# Patient Record
Sex: MALE | Race: WHITE | NOT HISPANIC OR LATINO | URBAN - METROPOLITAN AREA
[De-identification: names, ages, dates, MRNs, and addresses within clinical notes are randomized per-mention and may not be internally consistent; named-entity substitution may affect disease eponyms.]

---

## 2017-06-26 RX ORDER — VERAPAMIL HCL 240 MG
0 CAPSULE, EXTENDED RELEASE PELLETS 24 HR ORAL
Qty: 90 | Refills: 0 | COMMUNITY
Start: 2017-06-26

## 2017-06-26 RX ORDER — VERAPAMIL HCL 240 MG
1 CAPSULE, EXTENDED RELEASE PELLETS 24 HR ORAL
Qty: 90 | Refills: 0 | DISCHARGE
Start: 2017-06-26

## 2017-06-28 RX ORDER — ATORVASTATIN CALCIUM 80 MG/1
0 TABLET, FILM COATED ORAL
Qty: 90 | Refills: 0 | COMMUNITY
Start: 2017-06-28

## 2017-06-28 RX ORDER — ATORVASTATIN CALCIUM 80 MG/1
1 TABLET, FILM COATED ORAL
Qty: 90 | Refills: 0 | DISCHARGE
Start: 2017-06-28

## 2017-07-08 RX ORDER — PHENELZINE SULFATE 15 MG/1
2 TABLET, FILM COATED ORAL
Qty: 360 | Refills: 0 | COMMUNITY
Start: 2017-07-08

## 2017-07-08 RX ORDER — PHENELZINE SULFATE 15 MG/1
0 TABLET, FILM COATED ORAL
Qty: 360 | Refills: 0 | COMMUNITY
Start: 2017-07-08

## 2017-07-08 RX ORDER — PHENELZINE SULFATE 15 MG/1
1 TABLET, FILM COATED ORAL
Qty: 360 | Refills: 0 | COMMUNITY
Start: 2017-07-08

## 2017-08-03 ENCOUNTER — OUTPATIENT (OUTPATIENT)
Dept: OUTPATIENT SERVICES | Facility: HOSPITAL | Age: 77
LOS: 1 days | End: 2017-08-03
Payer: MEDICARE

## 2017-08-03 VITALS
OXYGEN SATURATION: 97 % | DIASTOLIC BLOOD PRESSURE: 80 MMHG | SYSTOLIC BLOOD PRESSURE: 124 MMHG | RESPIRATION RATE: 14 BRPM | HEIGHT: 65 IN | WEIGHT: 246.92 LBS | HEART RATE: 62 BPM | TEMPERATURE: 98 F

## 2017-08-03 DIAGNOSIS — Z98.890 OTHER SPECIFIED POSTPROCEDURAL STATES: Chronic | ICD-10-CM

## 2017-08-03 DIAGNOSIS — M16.11 UNILATERAL PRIMARY OSTEOARTHRITIS, RIGHT HIP: ICD-10-CM

## 2017-08-03 DIAGNOSIS — Z01.818 ENCOUNTER FOR OTHER PREPROCEDURAL EXAMINATION: ICD-10-CM

## 2017-08-03 DIAGNOSIS — Z90.89 ACQUIRED ABSENCE OF OTHER ORGANS: Chronic | ICD-10-CM

## 2017-08-03 DIAGNOSIS — Z87.39 PERSONAL HISTORY OF OTHER DISEASES OF THE MUSCULOSKELETAL SYSTEM AND CONNECTIVE TISSUE: ICD-10-CM

## 2017-08-03 LAB
ALBUMIN SERPL ELPH-MCNC: 3.9 G/DL — SIGNIFICANT CHANGE UP (ref 3.3–5)
ALP SERPL-CCNC: 64 U/L — SIGNIFICANT CHANGE UP (ref 30–120)
ALT FLD-CCNC: 26 U/L DA — SIGNIFICANT CHANGE UP (ref 10–60)
ANION GAP SERPL CALC-SCNC: 8 MMOL/L — SIGNIFICANT CHANGE UP (ref 5–17)
APTT BLD: 34.9 SEC — SIGNIFICANT CHANGE UP (ref 27.5–37.4)
AST SERPL-CCNC: 21 U/L — SIGNIFICANT CHANGE UP (ref 10–40)
BILIRUB SERPL-MCNC: 1 MG/DL — SIGNIFICANT CHANGE UP (ref 0.2–1.2)
BLD GP AB SCN SERPL QL: SIGNIFICANT CHANGE UP
BUN SERPL-MCNC: 19 MG/DL — SIGNIFICANT CHANGE UP (ref 7–23)
CALCIUM SERPL-MCNC: 9.5 MG/DL — SIGNIFICANT CHANGE UP (ref 8.4–10.5)
CHLORIDE SERPL-SCNC: 103 MMOL/L — SIGNIFICANT CHANGE UP (ref 96–108)
CO2 SERPL-SCNC: 28 MMOL/L — SIGNIFICANT CHANGE UP (ref 22–31)
CREAT SERPL-MCNC: 0.92 MG/DL — SIGNIFICANT CHANGE UP (ref 0.5–1.3)
GLUCOSE SERPL-MCNC: 95 MG/DL — SIGNIFICANT CHANGE UP (ref 70–99)
HCT VFR BLD CALC: 38.9 % — LOW (ref 39–50)
HGB BLD-MCNC: 13 G/DL — SIGNIFICANT CHANGE UP (ref 13–17)
INR BLD: 0.98 RATIO — SIGNIFICANT CHANGE UP (ref 0.88–1.16)
MCHC RBC-ENTMCNC: 30.4 PG — SIGNIFICANT CHANGE UP (ref 27–34)
MCHC RBC-ENTMCNC: 33.3 GM/DL — SIGNIFICANT CHANGE UP (ref 32–36)
MCV RBC AUTO: 91.5 FL — SIGNIFICANT CHANGE UP (ref 80–100)
MRSA PCR RESULT.: SIGNIFICANT CHANGE UP
PLATELET # BLD AUTO: 201 K/UL — SIGNIFICANT CHANGE UP (ref 150–400)
POTASSIUM SERPL-MCNC: 4.9 MMOL/L — SIGNIFICANT CHANGE UP (ref 3.5–5.3)
POTASSIUM SERPL-SCNC: 4.9 MMOL/L — SIGNIFICANT CHANGE UP (ref 3.5–5.3)
PROT SERPL-MCNC: 7.7 G/DL — SIGNIFICANT CHANGE UP (ref 6–8.3)
PROTHROM AB SERPL-ACNC: 10.7 SEC — SIGNIFICANT CHANGE UP (ref 9.8–12.7)
RBC # BLD: 4.26 M/UL — SIGNIFICANT CHANGE UP (ref 4.2–5.8)
RBC # FLD: 11.8 % — SIGNIFICANT CHANGE UP (ref 10.3–14.5)
S AUREUS DNA NOSE QL NAA+PROBE: DETECTED
SODIUM SERPL-SCNC: 139 MMOL/L — SIGNIFICANT CHANGE UP (ref 135–145)
WBC # BLD: 5.1 K/UL — SIGNIFICANT CHANGE UP (ref 3.8–10.5)
WBC # FLD AUTO: 5.1 K/UL — SIGNIFICANT CHANGE UP (ref 3.8–10.5)

## 2017-08-03 PROCEDURE — 86900 BLOOD TYPING SEROLOGIC ABO: CPT

## 2017-08-03 PROCEDURE — 87640 STAPH A DNA AMP PROBE: CPT

## 2017-08-03 PROCEDURE — 85027 COMPLETE CBC AUTOMATED: CPT

## 2017-08-03 PROCEDURE — 85730 THROMBOPLASTIN TIME PARTIAL: CPT

## 2017-08-03 PROCEDURE — 86901 BLOOD TYPING SEROLOGIC RH(D): CPT

## 2017-08-03 PROCEDURE — 80053 COMPREHEN METABOLIC PANEL: CPT

## 2017-08-03 PROCEDURE — G0463: CPT

## 2017-08-03 PROCEDURE — 87641 MR-STAPH DNA AMP PROBE: CPT

## 2017-08-03 PROCEDURE — 86850 RBC ANTIBODY SCREEN: CPT

## 2017-08-03 PROCEDURE — 85610 PROTHROMBIN TIME: CPT

## 2017-08-03 PROCEDURE — 36415 COLL VENOUS BLD VENIPUNCTURE: CPT

## 2017-08-03 NOTE — H&P PST ADULT - HISTORY OF PRESENT ILLNESS
77 Y.o . male presents here w/ long standing hx. of right knee pain 10/10 w/ decreased R.O.M., ambulation, mobility and ADL function. Seen by  and referred for surgery.  Takes no meds

## 2017-08-03 NOTE — H&P PST ADULT - PSH
H/O eye surgery  drooping upper eyelids 2013  S/P inguinal hernia repair    S/P tonsillectomy  as a child

## 2017-08-03 NOTE — H&P PST ADULT - PROBLEM SELECTOR PLAN 1
Right Total Hip Replacement  NPO after Midnight. Take pepcid as ordered.  Nose cx instructions reviewed. Dietary instructions reviewed. 3day wipes reviewed. D/C instructions reviewed.  Patient advised of no jewelry day of surgery.  Handouts given.  Medication reviewed. Stop NSAIDS, ASA herbals, vit E per PMD instructions or 7 days prior to surgery .  Medical Clearance to be obtained.  SHAUNA CHEN  Pharmacy consult done.  Day of Surgery you can take the following meds with a small sip of water. nardil, verapamil, donepezil, lorazepam

## 2017-08-03 NOTE — H&P PST ADULT - NSANTHOSAYNRD_GEN_A_CORE
No. KWAKU screening performed.  STOP BANG Legend: 0-2 = LOW Risk; 3-4 = INTERMEDIATE Risk; 5-8 = HIGH Risk

## 2017-08-03 NOTE — H&P PST ADULT - FAMILY HISTORY
Father  Still living? No  Family history of cardiac disorder, Age at diagnosis: Age Unknown     Mother  Still living? No  Family history of liver cancer, Age at diagnosis: Age Unknown

## 2017-08-04 RX ORDER — MUPIROCIN 20 MG/G
1 OINTMENT TOPICAL
Qty: 1 | Refills: 0 | OUTPATIENT
Start: 2017-08-04 | End: 2017-08-09

## 2017-08-04 NOTE — PROGRESS NOTE ADULT - SUBJECTIVE AND OBJECTIVE BOX
Nose culture PCR results: staph aureus detected  Topical Mupirocin escribed  Spoke to patient's wife, understands treatment

## 2017-08-11 RX ORDER — SODIUM CHLORIDE 9 MG/ML
1000 INJECTION, SOLUTION INTRAVENOUS
Qty: 0 | Refills: 0 | Status: DISCONTINUED | OUTPATIENT
Start: 2017-08-17 | End: 2017-08-17

## 2017-08-11 NOTE — PROGRESS NOTE ADULT - SUBJECTIVE AND OBJECTIVE BOX
Pharmacy presurgical evaluation:  NKDA    Past Medical History:  Anxiety and depression  (Lorazepam 1mg Qday, Phenelzine 15mg Qday – MAO inhibitor)  Mild BPH  Mild cognitive impairment (Donepezil 10mg Qday)  OA  HLD (Atorvastatin 20mg HS, Aspirin 81mg Qday)  HTN (Enalapril 2.5mg Qday, Verapamil ER 240mg Qday)    Past Surgical History:  S/P inguinal hernia repair    S/P tonsillectomy  as a child    RECOMMENDATIONS:  1. Patient takes Phenelzine, an MAO inhibitor. MAO inhibitors will interact with all opioids, including tramadol. Recommendation is to discontinue MAO inhibitor 10-14 days before initiation of therapy with oxycodone. MAOIs have been reported to intensify the effects of opioid drugs, resulting in increased risk for anxiety, confusion, hypotension, respiratory depression, profound sedation, coma, and death. According to PMD clearance, patient was instructed to discontinue Phenelzine 10 days prior to surgery. Instructed to take Lorazepam 1mg QID PRN. Patient is aware that if anxiety becomes progressively worse or unmanageable postop he may need a psychiatry consult. He has been instructed to remain off Phenelzine until all narcotics are finished.  2. Donepezil and ondansetron concurrent use can lead to QT prolongation and Torsades de pointes – remain on telemetry until PONV is no longer an issue.  3. Donepezil and oxycodone concurrently can lower seizure threshold – monitor patient closely.

## 2017-08-16 RX ORDER — ONDANSETRON 8 MG/1
4 TABLET, FILM COATED ORAL EVERY 6 HOURS
Qty: 0 | Refills: 0 | Status: DISCONTINUED | OUTPATIENT
Start: 2017-08-17 | End: 2017-08-21

## 2017-08-16 RX ORDER — SENNA PLUS 8.6 MG/1
2 TABLET ORAL AT BEDTIME
Qty: 0 | Refills: 0 | Status: DISCONTINUED | OUTPATIENT
Start: 2017-08-17 | End: 2017-08-21

## 2017-08-16 RX ORDER — POLYETHYLENE GLYCOL 3350 17 G/17G
17 POWDER, FOR SOLUTION ORAL DAILY
Qty: 0 | Refills: 0 | Status: DISCONTINUED | OUTPATIENT
Start: 2017-08-17 | End: 2017-08-21

## 2017-08-16 RX ORDER — CELECOXIB 200 MG/1
200 CAPSULE ORAL ONCE
Qty: 0 | Refills: 0 | Status: COMPLETED | OUTPATIENT
Start: 2017-08-17 | End: 2017-08-17

## 2017-08-16 RX ORDER — MAGNESIUM HYDROXIDE 400 MG/1
30 TABLET, CHEWABLE ORAL DAILY
Qty: 0 | Refills: 0 | Status: DISCONTINUED | OUTPATIENT
Start: 2017-08-17 | End: 2017-08-21

## 2017-08-16 RX ORDER — SODIUM CHLORIDE 9 MG/ML
1000 INJECTION, SOLUTION INTRAVENOUS
Qty: 0 | Refills: 0 | Status: DISCONTINUED | OUTPATIENT
Start: 2017-08-17 | End: 2017-08-21

## 2017-08-16 RX ORDER — APREPITANT 80 MG/1
40 CAPSULE ORAL ONCE
Qty: 0 | Refills: 0 | Status: COMPLETED | OUTPATIENT
Start: 2017-08-17 | End: 2017-08-17

## 2017-08-16 RX ORDER — PANTOPRAZOLE SODIUM 20 MG/1
40 TABLET, DELAYED RELEASE ORAL
Qty: 0 | Refills: 0 | Status: DISCONTINUED | OUTPATIENT
Start: 2017-08-17 | End: 2017-08-21

## 2017-08-16 RX ORDER — DOCUSATE SODIUM 100 MG
100 CAPSULE ORAL THREE TIMES A DAY
Qty: 0 | Refills: 0 | Status: DISCONTINUED | OUTPATIENT
Start: 2017-08-17 | End: 2017-08-21

## 2017-08-17 ENCOUNTER — INPATIENT (INPATIENT)
Facility: HOSPITAL | Age: 77
LOS: 3 days | Discharge: HOME | DRG: 469 | End: 2017-08-21
Attending: ORTHOPAEDIC SURGERY | Admitting: ORTHOPAEDIC SURGERY
Payer: MEDICARE

## 2017-08-17 ENCOUNTER — TRANSCRIPTION ENCOUNTER (OUTPATIENT)
Age: 77
End: 2017-08-17

## 2017-08-17 ENCOUNTER — RESULT REVIEW (OUTPATIENT)
Age: 77
End: 2017-08-17

## 2017-08-17 VITALS
WEIGHT: 131.18 LBS | RESPIRATION RATE: 11 BRPM | DIASTOLIC BLOOD PRESSURE: 83 MMHG | SYSTOLIC BLOOD PRESSURE: 139 MMHG | TEMPERATURE: 98 F | OXYGEN SATURATION: 99 % | HEART RATE: 64 BPM | HEIGHT: 65 IN

## 2017-08-17 DIAGNOSIS — Z98.890 OTHER SPECIFIED POSTPROCEDURAL STATES: Chronic | ICD-10-CM

## 2017-08-17 DIAGNOSIS — M16.11 UNILATERAL PRIMARY OSTEOARTHRITIS, RIGHT HIP: ICD-10-CM

## 2017-08-17 DIAGNOSIS — Z90.89 ACQUIRED ABSENCE OF OTHER ORGANS: Chronic | ICD-10-CM

## 2017-08-17 LAB
ANION GAP SERPL CALC-SCNC: 5 MMOL/L — SIGNIFICANT CHANGE UP (ref 5–17)
BUN SERPL-MCNC: 18 MG/DL — SIGNIFICANT CHANGE UP (ref 7–23)
CALCIUM SERPL-MCNC: 8.8 MG/DL — SIGNIFICANT CHANGE UP (ref 8.4–10.5)
CHLORIDE SERPL-SCNC: 105 MMOL/L — SIGNIFICANT CHANGE UP (ref 96–108)
CO2 SERPL-SCNC: 28 MMOL/L — SIGNIFICANT CHANGE UP (ref 22–31)
CREAT SERPL-MCNC: 0.79 MG/DL — SIGNIFICANT CHANGE UP (ref 0.5–1.3)
GLUCOSE SERPL-MCNC: 189 MG/DL — HIGH (ref 70–99)
HCT VFR BLD CALC: 35.8 % — LOW (ref 39–50)
HGB BLD-MCNC: 11.6 G/DL — LOW (ref 13–17)
MCHC RBC-ENTMCNC: 31.2 PG — SIGNIFICANT CHANGE UP (ref 27–34)
MCHC RBC-ENTMCNC: 32.5 GM/DL — SIGNIFICANT CHANGE UP (ref 32–36)
MCV RBC AUTO: 96.1 FL — SIGNIFICANT CHANGE UP (ref 80–100)
PLATELET # BLD AUTO: 174 K/UL — SIGNIFICANT CHANGE UP (ref 150–400)
POTASSIUM SERPL-MCNC: 4.8 MMOL/L — SIGNIFICANT CHANGE UP (ref 3.5–5.3)
POTASSIUM SERPL-SCNC: 4.8 MMOL/L — SIGNIFICANT CHANGE UP (ref 3.5–5.3)
RBC # BLD: 3.72 M/UL — LOW (ref 4.2–5.8)
RBC # FLD: 11.7 % — SIGNIFICANT CHANGE UP (ref 10.3–14.5)
SODIUM SERPL-SCNC: 138 MMOL/L — SIGNIFICANT CHANGE UP (ref 135–145)
WBC # BLD: 9.7 K/UL — SIGNIFICANT CHANGE UP (ref 3.8–10.5)
WBC # FLD AUTO: 9.7 K/UL — SIGNIFICANT CHANGE UP (ref 3.8–10.5)

## 2017-08-17 PROCEDURE — 99222 1ST HOSP IP/OBS MODERATE 55: CPT

## 2017-08-17 PROCEDURE — 88305 TISSUE EXAM BY PATHOLOGIST: CPT | Mod: 26

## 2017-08-17 PROCEDURE — 88311 DECALCIFY TISSUE: CPT | Mod: 26

## 2017-08-17 RX ORDER — DONEPEZIL HYDROCHLORIDE 10 MG/1
10 TABLET, FILM COATED ORAL DAILY
Qty: 0 | Refills: 0 | Status: DISCONTINUED | OUTPATIENT
Start: 2017-08-17 | End: 2017-08-21

## 2017-08-17 RX ORDER — ONDANSETRON 8 MG/1
4 TABLET, FILM COATED ORAL ONCE
Qty: 0 | Refills: 0 | Status: DISCONTINUED | OUTPATIENT
Start: 2017-08-17 | End: 2017-08-17

## 2017-08-17 RX ORDER — ACETAMINOPHEN 500 MG
1000 TABLET ORAL ONCE
Qty: 0 | Refills: 0 | Status: COMPLETED | OUTPATIENT
Start: 2017-08-17 | End: 2017-08-17

## 2017-08-17 RX ORDER — SODIUM CHLORIDE 9 MG/ML
1000 INJECTION, SOLUTION INTRAVENOUS
Qty: 0 | Refills: 0 | Status: DISCONTINUED | OUTPATIENT
Start: 2017-08-17 | End: 2017-08-17

## 2017-08-17 RX ORDER — OXYCODONE HYDROCHLORIDE 5 MG/1
10 TABLET ORAL
Qty: 0 | Refills: 0 | Status: DISCONTINUED | OUTPATIENT
Start: 2017-08-17 | End: 2017-08-21

## 2017-08-17 RX ORDER — CEFAZOLIN SODIUM 1 G
2000 VIAL (EA) INJECTION ONCE
Qty: 0 | Refills: 0 | Status: COMPLETED | OUTPATIENT
Start: 2017-08-17 | End: 2017-08-17

## 2017-08-17 RX ORDER — TRANEXAMIC ACID 100 MG/ML
1000 INJECTION, SOLUTION INTRAVENOUS ONCE
Qty: 0 | Refills: 0 | Status: COMPLETED | OUTPATIENT
Start: 2017-08-17 | End: 2017-08-17

## 2017-08-17 RX ORDER — CELECOXIB 200 MG/1
200 CAPSULE ORAL
Qty: 0 | Refills: 0 | Status: DISCONTINUED | OUTPATIENT
Start: 2017-08-17 | End: 2017-08-21

## 2017-08-17 RX ORDER — ACETAMINOPHEN 500 MG
1000 TABLET ORAL EVERY 8 HOURS
Qty: 0 | Refills: 0 | Status: DISCONTINUED | OUTPATIENT
Start: 2017-08-18 | End: 2017-08-21

## 2017-08-17 RX ORDER — VERAPAMIL HCL 240 MG
240 CAPSULE, EXTENDED RELEASE PELLETS 24 HR ORAL DAILY
Qty: 0 | Refills: 0 | Status: DISCONTINUED | OUTPATIENT
Start: 2017-08-17 | End: 2017-08-21

## 2017-08-17 RX ORDER — ACETAMINOPHEN 500 MG
1000 TABLET ORAL EVERY 6 HOURS
Qty: 0 | Refills: 0 | Status: COMPLETED | OUTPATIENT
Start: 2017-08-17 | End: 2017-08-18

## 2017-08-17 RX ORDER — HYDROMORPHONE HYDROCHLORIDE 2 MG/ML
0.5 INJECTION INTRAMUSCULAR; INTRAVENOUS; SUBCUTANEOUS
Qty: 0 | Refills: 0 | Status: DISCONTINUED | OUTPATIENT
Start: 2017-08-17 | End: 2017-08-21

## 2017-08-17 RX ORDER — OXYCODONE HYDROCHLORIDE 5 MG/1
5 TABLET ORAL
Qty: 0 | Refills: 0 | Status: DISCONTINUED | OUTPATIENT
Start: 2017-08-17 | End: 2017-08-21

## 2017-08-17 RX ORDER — BENZOCAINE AND MENTHOL 5; 1 G/100ML; G/100ML
1 LIQUID ORAL
Qty: 0 | Refills: 0 | Status: DISCONTINUED | OUTPATIENT
Start: 2017-08-17 | End: 2017-08-21

## 2017-08-17 RX ORDER — ATORVASTATIN CALCIUM 80 MG/1
20 TABLET, FILM COATED ORAL AT BEDTIME
Qty: 0 | Refills: 0 | Status: DISCONTINUED | OUTPATIENT
Start: 2017-08-17 | End: 2017-08-21

## 2017-08-17 RX ORDER — ASPIRIN/CALCIUM CARB/MAGNESIUM 324 MG
1 TABLET ORAL
Qty: 0 | Refills: 0 | COMMUNITY

## 2017-08-17 RX ORDER — CEFAZOLIN SODIUM 1 G
2000 VIAL (EA) INJECTION EVERY 8 HOURS
Qty: 0 | Refills: 0 | Status: COMPLETED | OUTPATIENT
Start: 2017-08-17 | End: 2017-08-18

## 2017-08-17 RX ORDER — ASPIRIN/CALCIUM CARB/MAGNESIUM 324 MG
162 TABLET ORAL EVERY 12 HOURS
Qty: 0 | Refills: 0 | Status: DISCONTINUED | OUTPATIENT
Start: 2017-08-18 | End: 2017-08-21

## 2017-08-17 RX ORDER — FENTANYL CITRATE 50 UG/ML
25 INJECTION INTRAVENOUS
Qty: 0 | Refills: 0 | Status: DISCONTINUED | OUTPATIENT
Start: 2017-08-17 | End: 2017-08-17

## 2017-08-17 RX ADMIN — Medication 400 MILLIGRAM(S): at 19:59

## 2017-08-17 RX ADMIN — APREPITANT 40 MILLIGRAM(S): 80 CAPSULE ORAL at 10:17

## 2017-08-17 RX ADMIN — ATORVASTATIN CALCIUM 20 MILLIGRAM(S): 80 TABLET, FILM COATED ORAL at 21:50

## 2017-08-17 RX ADMIN — Medication 100 MILLIGRAM(S): at 19:59

## 2017-08-17 RX ADMIN — Medication 1000 MILLIGRAM(S): at 20:03

## 2017-08-17 RX ADMIN — SODIUM CHLORIDE 75 MILLILITER(S): 9 INJECTION, SOLUTION INTRAVENOUS at 10:16

## 2017-08-17 RX ADMIN — BENZOCAINE AND MENTHOL 1 LOZENGE: 5; 1 LIQUID ORAL at 20:00

## 2017-08-17 RX ADMIN — SODIUM CHLORIDE 100 MILLILITER(S): 9 INJECTION, SOLUTION INTRAVENOUS at 15:15

## 2017-08-17 RX ADMIN — Medication 1 MILLIGRAM(S): at 23:32

## 2017-08-17 RX ADMIN — CELECOXIB 200 MILLIGRAM(S): 200 CAPSULE ORAL at 10:16

## 2017-08-17 RX ADMIN — Medication 100 MILLIGRAM(S): at 21:49

## 2017-08-17 RX ADMIN — SENNA PLUS 2 TABLET(S): 8.6 TABLET ORAL at 21:49

## 2017-08-17 NOTE — CONSULT NOTE ADULT - SUBJECTIVE AND OBJECTIVE BOX
Patient is a 77y old  Male who presents with a chief complaint of "right hip replacement" (17 Aug 2017 09:43)      HPI: Off Nardil as long as on opiates.  Use Ativan prn for anxiety/insomnia.  feels well. pain tolerable     PAST MEDICAL & SURGICAL HISTORY:  Hypercholesteremia  Anxiety and depression  History of osteoarthritis  Hypertension  S/P tonsillectomy: as a child  H/O eye surgery: drooping upper eyelids 2013  S/P inguinal hernia repair      REVIEW OF SYSTEMS:    negative unless otherwise specified in HPI.      MEDICATIONS  (STANDING):  lactated ringers. 1000 milliLiter(s) (100 mL/Hr) IV Continuous <Continuous>    MEDICATIONS  (PRN):  fentaNYL    Injectable 25 MICROGram(s) IV Push every 5 minutes PRN Moderate Pain  ondansetron Injectable 4 milliGRAM(s) IV Push once PRN Nausea and/or Vomiting      Allergies    No Known Allergies    Intolerances        SOCIAL HISTORY:    FAMILY HISTORY:  Family history of liver cancer (Mother)  Family history of cardiac disorder (Father)      Vital Signs Last 24 Hrs  T(C): 36.4 (17 Aug 2017 14:26), Max: 36.6 (17 Aug 2017 09:37)  T(F): 97.5 (17 Aug 2017 14:26), Max: 97.9 (17 Aug 2017 09:37)  HR: 60 (17 Aug 2017 16:09) (60 - 69)  BP: 155/58 (17 Aug 2017 14:41) (136/88 - 155/58)  BP(mean): --  RR: 12 (17 Aug 2017 15:11) (11 - 15)  SpO2: 99% (17 Aug 2017 16:09) (99% - 100%)    PHYSICAL EXAM:  GENERAL: No apparent distress  HEAD:  Atraumatic, Normocephalic  EYES: conjunctiva and sclera clear  ENMT: Moist mucous membranes  NECK: Supple, No Jugular venous distension.  NERVOUS SYSTEM:  Alert & Oriented X3, Good concentration; Bilateral Lower extremity mobile, sensation to light touch intact  CHEST/LUNG: Clear to auscultation bilaterally; No rales, rhonchi, wheezing, or rubs  HEART: Regular rate and rhythm; No murmurs, rubs, or gallops  ABDOMEN: Soft, Nontender, Nondistended; Bowel sounds present  EXTREMITIES:  2+ Peripheral Pulses, No clubbing or cyanosis  LYMPH: No lymphadenopathy noted  SKIN: No rashes or lesions  INCISION:  Dressing dry and intact    LABS:    IMAGING: imaging reviewed personally    Consultant Notes Reviewed and Care Discussed with relevant Consultants/Other Providers.

## 2017-08-17 NOTE — DISCHARGE NOTE ADULT - MEDICATION SUMMARY - MEDICATIONS TO TAKE
I will START or STAY ON the medications listed below when I get home from the hospital:    3:1 commode  -- Dx: Right THR  -- Indication: For Equipment    Hip Kit  -- Dx: Right THR  -- Indication: For Equipment    rolling walker  -- Dx: Right THR  -- Indication: For Equipment    celecoxib 200 mg oral capsule  -- 1 cap(s) by mouth 2 times a day (with meals)  -- Indication: For Pain and heterotopic bone prevention    acetaminophen 500 mg oral tablet  -- 2 tab(s) by mouth every 8 hours for 2 weeks then as needed fo rmild pain  -- Indication: For Pain    Roxicodone 5 mg oral tablet  -- 1 tab(s) by mouth every 4 hours prn hip pain MDD:6  -- Indication: For Pain    aspirin 81 mg oral delayed release tablet  -- 2 tab(s) by mouth every 12 hours MDD:4  -- Indication: For blood clot prevention    ENALAPRIL MALEATE 2.5 MG TAB  -- 1 tab(s) by mouth once a day  -- Indication: For Home med    aluminum hydroxide-magnesium hydroxide 200 mg-200 mg/5 mL oral suspension  -- 30 milliliter(s) by mouth 4 times a day, As needed, Indigestion  -- Indication: For indigestion    magnesium hydroxide 8% oral suspension  -- 30 milliliter(s) by mouth once a day, As needed, Constipation  -- Indication: For Constipation    VERAPAMIL  MG TABLET  -- 1 tab(s) by mouth once a day  -- Indication: For Home med    LORazepam 1 mg oral tablet  -- 1 tab(s) by mouth once a day  -- Indication: For Home emd    ATORVASTATIN 20 MG TABLET  -- 1 tab(s) by mouth once a day  -- Indication: For Home med    donepezil 10 mg oral tablet  -- 1 tab(s) by mouth once a day  -- Indication: For Home med    docusate sodium 100 mg oral capsule  -- 1 cap(s) by mouth 3 times a day  -- Indication: For Constipation    polyethylene glycol 3350 oral powder for reconstitution  -- 17 gram(s) by mouth once a day, As needed, Constipation  -- Indication: For Constiation    senna oral tablet  -- 2 tab(s) by mouth once a day (at bedtime)  -- Indication: For Constipation    pantoprazole 40 mg oral delayed release tablet  -- 1 tab(s) by mouth once a day (before a meal) MDD:1  -- Indication: For stomach ulcer prevention

## 2017-08-17 NOTE — DISCHARGE NOTE ADULT - INSTRUCTIONS
none none  For Constipation :   • Increase your water intake. Drink at least 8 glasses of water daily.  • Try adding fiber to your diet by eating fruits, vegetables and foods that are rich in grains.  • If you do experience constipation, you may take an over-the-counter stool softener/laxative such as Christy Colace, [place] or  Milk of Magnesia.

## 2017-08-17 NOTE — DISCHARGE NOTE ADULT - NS AS ACTIVITY OBS
Walking-Indoors allowed/Stairs allowed/Do not make important decisions/Showering allowed/Do not drive or operate machinery Walking-Indoors allowed/Stairs allowed/Walking-Outdoors allowed/No Heavy lifting/straining/Do not drive or operate machinery/Do not make important decisions/Showering allowed

## 2017-08-17 NOTE — DISCHARGE NOTE ADULT - OTHER SIGNIFICANT FINDINGS
Patient was taking Nardil at home for depression and was stopped pre-op for anesthesia and pain medication interactions. Pt stopped medication 9 days prior to procedure. Patients last dose in hospital of oxycodone was on saturday 8/21/17 and was given prescriptions to take home as needed. Patient to go see Primary care doctor this week to be put back on nardil once cleared by primary care doctor. Patient and wife understood and agreed.

## 2017-08-17 NOTE — DISCHARGE NOTE ADULT - HOSPITAL COURSE
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  Patient went to Pre-Surgical Testing at [facility] and was medically cleared to undergo elective procedure.  Right THR performed on 8/17/19 by Dr. Mojica. No operative or kei-operative complications arose during patients hospital course.  Patient received antibiotic according to SCIP guidelines for infection prevention.  Ecotrin was given for DVT prophylaxis.  Anesthesia, Medical Hospitalist, Physical Therapy and Occupational Therapy were consulted. Patient is stable for discharge rehab with a good prognosis.  Appropriate discharge instructions and medications are provided in this document. 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  Patient went to Pre-Surgical Testing at [facility] and was medically cleared to undergo elective procedure.  Right THR performed on 8/17/19 by Dr. Mojica. No operative or kei-operative complications arose during patients hospital course.  Patient received antibiotic according to SCIP guidelines for infection prevention.  Ecotrin was given for DVT prophylaxis.  Anesthesia, Medical Hospitalist, Physical Therapy and Occupational Therapy were consulted. Patient is stable for discharge home with a good prognosis.  Appropriate discharge instructions and medications are provided in this document.

## 2017-08-17 NOTE — DISCHARGE NOTE ADULT - PATIENT PORTAL LINK FT
“You can access the [facility] Patient Portal, offered by [facility], by registering with the following website: [email or web address]”

## 2017-08-17 NOTE — DISCHARGE NOTE ADULT - CARE PROVIDER_API CALL
SARBJIT Mojica (MD), Orthopaedic Surgery  [address] [place] [address]  Phone: [phone]  Fax: [phone]

## 2017-08-17 NOTE — DISCHARGE NOTE ADULT - PLAN OF CARE
Improve ambulation, ADLs and quality of life Physical Therapy/Occupational Therapy for: Ambulation, transfers, stairs, & ADLs, isometrics.  Full weight bearing on both legs; Walker/cane use as instructed by Physical therapy/Occupational therapy. Anterior Total Hip Replacement precautions for  6 weeks: No straight leg raise; No external rotation of hip when extended-standing or lying flat; No hyperextension of hip when standing (kickback).   Ice packs to hip for 30 min. every 3 hours and after physical therapy.   Keep incision clean and dry. May shower 5 days after surgery if no drainage from incision.  Prineo tape/suture removal on/near after Post Op Day # 14 in rehab facility / Surgeon's office.  HO prophylaxis: Celebrex 200mg twice a day for 21 days total

## 2017-08-17 NOTE — CONSULT NOTE ADULT - ASSESSMENT
77 male    1. right THR: Opiates prn + bowel regimen.  Physical Therapy evaluation.    2. anxiety/insomnia: ativan prn. high risk for delirium given advanced age.    3. HTN: Blood pressure meds with hold parameters     4. HLD: statin    5. dvt prophylaxis per orthopedic protocol     6. dispo: rehab in 2 days hopefully.  d/w wife at bedside.

## 2017-08-18 LAB
ANION GAP SERPL CALC-SCNC: 9 MMOL/L — SIGNIFICANT CHANGE UP (ref 5–17)
BUN SERPL-MCNC: 15 MG/DL — SIGNIFICANT CHANGE UP (ref 7–23)
CALCIUM SERPL-MCNC: 9.1 MG/DL — SIGNIFICANT CHANGE UP (ref 8.4–10.5)
CHLORIDE SERPL-SCNC: 104 MMOL/L — SIGNIFICANT CHANGE UP (ref 96–108)
CO2 SERPL-SCNC: 26 MMOL/L — SIGNIFICANT CHANGE UP (ref 22–31)
CREAT SERPL-MCNC: 1.23 MG/DL — SIGNIFICANT CHANGE UP (ref 0.5–1.3)
GLUCOSE SERPL-MCNC: 123 MG/DL — HIGH (ref 70–99)
HCT VFR BLD CALC: 34 % — LOW (ref 39–50)
HGB BLD-MCNC: 11.3 G/DL — LOW (ref 13–17)
MCHC RBC-ENTMCNC: 31.7 PG — SIGNIFICANT CHANGE UP (ref 27–34)
MCHC RBC-ENTMCNC: 33.3 GM/DL — SIGNIFICANT CHANGE UP (ref 32–36)
MCV RBC AUTO: 95.2 FL — SIGNIFICANT CHANGE UP (ref 80–100)
PLATELET # BLD AUTO: 183 K/UL — SIGNIFICANT CHANGE UP (ref 150–400)
POTASSIUM SERPL-MCNC: 4 MMOL/L — SIGNIFICANT CHANGE UP (ref 3.5–5.3)
POTASSIUM SERPL-SCNC: 4 MMOL/L — SIGNIFICANT CHANGE UP (ref 3.5–5.3)
RBC # BLD: 3.57 M/UL — LOW (ref 4.2–5.8)
RBC # FLD: 11.5 % — SIGNIFICANT CHANGE UP (ref 10.3–14.5)
SODIUM SERPL-SCNC: 139 MMOL/L — SIGNIFICANT CHANGE UP (ref 135–145)
WBC # BLD: 12.7 K/UL — HIGH (ref 3.8–10.5)
WBC # FLD AUTO: 12.7 K/UL — HIGH (ref 3.8–10.5)

## 2017-08-18 PROCEDURE — 99232 SBSQ HOSP IP/OBS MODERATE 35: CPT

## 2017-08-18 PROCEDURE — 73502 X-RAY EXAM HIP UNI 2-3 VIEWS: CPT | Mod: 26,RT

## 2017-08-18 RX ORDER — CELECOXIB 200 MG/1
1 CAPSULE ORAL
Qty: 38 | Refills: 0
Start: 2017-08-18 | End: 2017-09-06

## 2017-08-18 RX ORDER — ACETAMINOPHEN 500 MG
2 TABLET ORAL
Qty: 0 | Refills: 0 | DISCHARGE
Start: 2017-08-18 | End: 2017-09-08

## 2017-08-18 RX ADMIN — Medication 400 MILLIGRAM(S): at 00:49

## 2017-08-18 RX ADMIN — CELECOXIB 200 MILLIGRAM(S): 200 CAPSULE ORAL at 09:14

## 2017-08-18 RX ADMIN — OXYCODONE HYDROCHLORIDE 5 MILLIGRAM(S): 5 TABLET ORAL at 18:36

## 2017-08-18 RX ADMIN — CELECOXIB 200 MILLIGRAM(S): 200 CAPSULE ORAL at 17:46

## 2017-08-18 RX ADMIN — OXYCODONE HYDROCHLORIDE 5 MILLIGRAM(S): 5 TABLET ORAL at 09:10

## 2017-08-18 RX ADMIN — OXYCODONE HYDROCHLORIDE 5 MILLIGRAM(S): 5 TABLET ORAL at 17:46

## 2017-08-18 RX ADMIN — CELECOXIB 200 MILLIGRAM(S): 200 CAPSULE ORAL at 17:48

## 2017-08-18 RX ADMIN — OXYCODONE HYDROCHLORIDE 5 MILLIGRAM(S): 5 TABLET ORAL at 10:00

## 2017-08-18 RX ADMIN — Medication 100 MILLIGRAM(S): at 06:09

## 2017-08-18 RX ADMIN — Medication 400 MILLIGRAM(S): at 06:09

## 2017-08-18 RX ADMIN — Medication 1000 MILLIGRAM(S): at 06:11

## 2017-08-18 RX ADMIN — Medication 1000 MILLIGRAM(S): at 21:51

## 2017-08-18 RX ADMIN — Medication 1000 MILLIGRAM(S): at 12:25

## 2017-08-18 RX ADMIN — Medication 100 MILLIGRAM(S): at 12:25

## 2017-08-18 RX ADMIN — CELECOXIB 200 MILLIGRAM(S): 200 CAPSULE ORAL at 09:10

## 2017-08-18 RX ADMIN — Medication 1000 MILLIGRAM(S): at 00:51

## 2017-08-18 RX ADMIN — ATORVASTATIN CALCIUM 20 MILLIGRAM(S): 80 TABLET, FILM COATED ORAL at 21:52

## 2017-08-18 RX ADMIN — PANTOPRAZOLE SODIUM 40 MILLIGRAM(S): 20 TABLET, DELAYED RELEASE ORAL at 06:10

## 2017-08-18 RX ADMIN — Medication 100 MILLIGRAM(S): at 03:47

## 2017-08-18 RX ADMIN — Medication 1 MILLIGRAM(S): at 23:21

## 2017-08-18 RX ADMIN — Medication 162 MILLIGRAM(S): at 21:51

## 2017-08-18 RX ADMIN — Medication 162 MILLIGRAM(S): at 09:09

## 2017-08-18 RX ADMIN — Medication 100 MILLIGRAM(S): at 21:52

## 2017-08-18 RX ADMIN — DONEPEZIL HYDROCHLORIDE 10 MILLIGRAM(S): 10 TABLET, FILM COATED ORAL at 12:25

## 2017-08-18 RX ADMIN — SENNA PLUS 2 TABLET(S): 8.6 TABLET ORAL at 21:52

## 2017-08-18 NOTE — OCCUPATIONAL THERAPY INITIAL EVALUATION ADULT - ORIENTATION, REHAB EVAL
Patient educated verbally regarding role of OT, LE weight bearing status & pt. provided with education folder including functional exercises, TKR education & caregiver guide pamphlet./oriented to person, place, time and situation

## 2017-08-18 NOTE — OCCUPATIONAL THERAPY INITIAL EVALUATION ADULT - ADL RETRAINING, OT EVAL
Patient will dress lower body with minimal assistance, AE as needed within 3-5 sessions Patient will dress lower body Independently  AE as needed within 3-5 sessions

## 2017-08-18 NOTE — OCCUPATIONAL THERAPY INITIAL EVALUATION ADULT - LEVEL OF INDEPENDENCE: DRESS LOWER BODY, OT EVAL
dependent (less than 25% patients effort) tba further once hemovac removed/dependent (less than 25% patients effort)

## 2017-08-18 NOTE — OCCUPATIONAL THERAPY INITIAL EVALUATION ADULT - TRANSFER TRAINING, PT EVAL
Patient will transfer to toilet with DME as needed with minimal assistance within 3-5 sessions Patient will transfer to toilet with DME as needed Independently  within 3-5 sessions

## 2017-08-18 NOTE — OCCUPATIONAL THERAPY INITIAL EVALUATION ADULT - ADDITIONAL COMMENTS
26 NUVIA 1 HR, + tub & stall shower 7-8 NUVIA 1 HR through front or 12 -13 Steep NUVIA 1 HR through garage into condo/living room (pt states if needed he can stay on couch 1st level). 6-7 steps 1 HR x 2 to bedroom & bathroom with both tub & stall shower. Both have grab bars, pt uses stall.+ SAC

## 2017-08-19 DIAGNOSIS — Z47.1 AFTERCARE FOLLOWING JOINT REPLACEMENT SURGERY: ICD-10-CM

## 2017-08-19 DIAGNOSIS — E78.00 PURE HYPERCHOLESTEROLEMIA, UNSPECIFIED: ICD-10-CM

## 2017-08-19 DIAGNOSIS — I10 ESSENTIAL (PRIMARY) HYPERTENSION: ICD-10-CM

## 2017-08-19 DIAGNOSIS — F41.9 ANXIETY DISORDER, UNSPECIFIED: ICD-10-CM

## 2017-08-19 DIAGNOSIS — D50.0 IRON DEFICIENCY ANEMIA SECONDARY TO BLOOD LOSS (CHRONIC): ICD-10-CM

## 2017-08-19 LAB
ANION GAP SERPL CALC-SCNC: 6 MMOL/L — SIGNIFICANT CHANGE UP (ref 5–17)
BUN SERPL-MCNC: 18 MG/DL — SIGNIFICANT CHANGE UP (ref 7–23)
CALCIUM SERPL-MCNC: 8.9 MG/DL — SIGNIFICANT CHANGE UP (ref 8.4–10.5)
CHLORIDE SERPL-SCNC: 107 MMOL/L — SIGNIFICANT CHANGE UP (ref 96–108)
CO2 SERPL-SCNC: 29 MMOL/L — SIGNIFICANT CHANGE UP (ref 22–31)
CREAT SERPL-MCNC: 0.75 MG/DL — SIGNIFICANT CHANGE UP (ref 0.5–1.3)
GLUCOSE SERPL-MCNC: 95 MG/DL — SIGNIFICANT CHANGE UP (ref 70–99)
HCT VFR BLD CALC: 32.8 % — LOW (ref 39–50)
HGB BLD-MCNC: 10.7 G/DL — LOW (ref 13–17)
MCHC RBC-ENTMCNC: 31.3 PG — SIGNIFICANT CHANGE UP (ref 27–34)
MCHC RBC-ENTMCNC: 32.8 GM/DL — SIGNIFICANT CHANGE UP (ref 32–36)
MCV RBC AUTO: 95.4 FL — SIGNIFICANT CHANGE UP (ref 80–100)
PLATELET # BLD AUTO: 150 K/UL — SIGNIFICANT CHANGE UP (ref 150–400)
POTASSIUM SERPL-MCNC: 4.2 MMOL/L — SIGNIFICANT CHANGE UP (ref 3.5–5.3)
POTASSIUM SERPL-SCNC: 4.2 MMOL/L — SIGNIFICANT CHANGE UP (ref 3.5–5.3)
RBC # BLD: 3.43 M/UL — LOW (ref 4.2–5.8)
RBC # FLD: 11.6 % — SIGNIFICANT CHANGE UP (ref 10.3–14.5)
SODIUM SERPL-SCNC: 142 MMOL/L — SIGNIFICANT CHANGE UP (ref 135–145)
WBC # BLD: 10.8 K/UL — HIGH (ref 3.8–10.5)
WBC # FLD AUTO: 10.8 K/UL — HIGH (ref 3.8–10.5)

## 2017-08-19 PROCEDURE — 99233 SBSQ HOSP IP/OBS HIGH 50: CPT

## 2017-08-19 RX ADMIN — CELECOXIB 200 MILLIGRAM(S): 200 CAPSULE ORAL at 18:47

## 2017-08-19 RX ADMIN — OXYCODONE HYDROCHLORIDE 5 MILLIGRAM(S): 5 TABLET ORAL at 08:46

## 2017-08-19 RX ADMIN — DONEPEZIL HYDROCHLORIDE 10 MILLIGRAM(S): 10 TABLET, FILM COATED ORAL at 12:46

## 2017-08-19 RX ADMIN — Medication 1000 MILLIGRAM(S): at 06:01

## 2017-08-19 RX ADMIN — Medication 100 MILLIGRAM(S): at 12:46

## 2017-08-19 RX ADMIN — OXYCODONE HYDROCHLORIDE 5 MILLIGRAM(S): 5 TABLET ORAL at 09:30

## 2017-08-19 RX ADMIN — Medication 1000 MILLIGRAM(S): at 12:46

## 2017-08-19 RX ADMIN — Medication 1 MILLIGRAM(S): at 21:33

## 2017-08-19 RX ADMIN — Medication 100 MILLIGRAM(S): at 06:02

## 2017-08-19 RX ADMIN — CELECOXIB 200 MILLIGRAM(S): 200 CAPSULE ORAL at 18:13

## 2017-08-19 RX ADMIN — CELECOXIB 200 MILLIGRAM(S): 200 CAPSULE ORAL at 08:45

## 2017-08-19 RX ADMIN — PANTOPRAZOLE SODIUM 40 MILLIGRAM(S): 20 TABLET, DELAYED RELEASE ORAL at 06:02

## 2017-08-19 RX ADMIN — Medication 2.5 MILLIGRAM(S): at 06:02

## 2017-08-19 RX ADMIN — Medication 240 MILLIGRAM(S): at 06:03

## 2017-08-19 RX ADMIN — CELECOXIB 200 MILLIGRAM(S): 200 CAPSULE ORAL at 08:47

## 2017-08-19 RX ADMIN — ATORVASTATIN CALCIUM 20 MILLIGRAM(S): 80 TABLET, FILM COATED ORAL at 21:32

## 2017-08-19 RX ADMIN — Medication 1000 MILLIGRAM(S): at 21:31

## 2017-08-19 RX ADMIN — Medication 162 MILLIGRAM(S): at 08:45

## 2017-08-19 RX ADMIN — Medication 162 MILLIGRAM(S): at 21:31

## 2017-08-19 RX ADMIN — Medication 100 MILLIGRAM(S): at 21:31

## 2017-08-20 DIAGNOSIS — N18.2 CHRONIC KIDNEY DISEASE, STAGE 2 (MILD): ICD-10-CM

## 2017-08-20 LAB
ANION GAP SERPL CALC-SCNC: 6 MMOL/L — SIGNIFICANT CHANGE UP (ref 5–17)
BUN SERPL-MCNC: 19 MG/DL — SIGNIFICANT CHANGE UP (ref 7–23)
CALCIUM SERPL-MCNC: 9 MG/DL — SIGNIFICANT CHANGE UP (ref 8.4–10.5)
CHLORIDE SERPL-SCNC: 107 MMOL/L — SIGNIFICANT CHANGE UP (ref 96–108)
CO2 SERPL-SCNC: 29 MMOL/L — SIGNIFICANT CHANGE UP (ref 22–31)
CREAT SERPL-MCNC: 0.82 MG/DL — SIGNIFICANT CHANGE UP (ref 0.5–1.3)
GLUCOSE SERPL-MCNC: 91 MG/DL — SIGNIFICANT CHANGE UP (ref 70–99)
HCT VFR BLD CALC: 31.9 % — LOW (ref 39–50)
HGB BLD-MCNC: 10.5 G/DL — LOW (ref 13–17)
MCHC RBC-ENTMCNC: 31.9 PG — SIGNIFICANT CHANGE UP (ref 27–34)
MCHC RBC-ENTMCNC: 32.8 GM/DL — SIGNIFICANT CHANGE UP (ref 32–36)
MCV RBC AUTO: 97.1 FL — SIGNIFICANT CHANGE UP (ref 80–100)
PLATELET # BLD AUTO: 161 K/UL — SIGNIFICANT CHANGE UP (ref 150–400)
POTASSIUM SERPL-MCNC: 4.6 MMOL/L — SIGNIFICANT CHANGE UP (ref 3.5–5.3)
POTASSIUM SERPL-SCNC: 4.6 MMOL/L — SIGNIFICANT CHANGE UP (ref 3.5–5.3)
RBC # BLD: 3.28 M/UL — LOW (ref 4.2–5.8)
RBC # FLD: 12.1 % — SIGNIFICANT CHANGE UP (ref 10.3–14.5)
SODIUM SERPL-SCNC: 142 MMOL/L — SIGNIFICANT CHANGE UP (ref 135–145)
WBC # BLD: 8.6 K/UL — SIGNIFICANT CHANGE UP (ref 3.8–10.5)
WBC # FLD AUTO: 8.6 K/UL — SIGNIFICANT CHANGE UP (ref 3.8–10.5)

## 2017-08-20 PROCEDURE — 99233 SBSQ HOSP IP/OBS HIGH 50: CPT

## 2017-08-20 RX ADMIN — ATORVASTATIN CALCIUM 20 MILLIGRAM(S): 80 TABLET, FILM COATED ORAL at 21:02

## 2017-08-20 RX ADMIN — Medication 240 MILLIGRAM(S): at 06:12

## 2017-08-20 RX ADMIN — CELECOXIB 200 MILLIGRAM(S): 200 CAPSULE ORAL at 17:00

## 2017-08-20 RX ADMIN — DONEPEZIL HYDROCHLORIDE 10 MILLIGRAM(S): 10 TABLET, FILM COATED ORAL at 11:05

## 2017-08-20 RX ADMIN — CELECOXIB 200 MILLIGRAM(S): 200 CAPSULE ORAL at 07:30

## 2017-08-20 RX ADMIN — Medication 1 MILLIGRAM(S): at 23:21

## 2017-08-20 RX ADMIN — Medication 1000 MILLIGRAM(S): at 11:04

## 2017-08-20 RX ADMIN — CELECOXIB 200 MILLIGRAM(S): 200 CAPSULE ORAL at 08:00

## 2017-08-20 RX ADMIN — Medication 1000 MILLIGRAM(S): at 06:12

## 2017-08-20 RX ADMIN — PANTOPRAZOLE SODIUM 40 MILLIGRAM(S): 20 TABLET, DELAYED RELEASE ORAL at 06:13

## 2017-08-20 RX ADMIN — Medication 100 MILLIGRAM(S): at 06:12

## 2017-08-20 RX ADMIN — SENNA PLUS 2 TABLET(S): 8.6 TABLET ORAL at 21:02

## 2017-08-20 RX ADMIN — Medication 162 MILLIGRAM(S): at 21:02

## 2017-08-20 RX ADMIN — Medication 100 MILLIGRAM(S): at 21:02

## 2017-08-20 RX ADMIN — Medication 162 MILLIGRAM(S): at 07:30

## 2017-08-20 RX ADMIN — CELECOXIB 200 MILLIGRAM(S): 200 CAPSULE ORAL at 16:59

## 2017-08-20 RX ADMIN — Medication 2.5 MILLIGRAM(S): at 06:13

## 2017-08-20 RX ADMIN — Medication 100 MILLIGRAM(S): at 11:04

## 2017-08-20 RX ADMIN — Medication 1000 MILLIGRAM(S): at 21:02

## 2017-08-21 VITALS
DIASTOLIC BLOOD PRESSURE: 58 MMHG | HEART RATE: 66 BPM | SYSTOLIC BLOOD PRESSURE: 101 MMHG | OXYGEN SATURATION: 97 % | TEMPERATURE: 98 F | RESPIRATION RATE: 16 BRPM

## 2017-08-21 LAB — SURGICAL PATHOLOGY FINAL REPORT - CH: SIGNIFICANT CHANGE UP

## 2017-08-21 PROCEDURE — C1776: CPT

## 2017-08-21 PROCEDURE — 88311 DECALCIFY TISSUE: CPT

## 2017-08-21 PROCEDURE — 73502 X-RAY EXAM HIP UNI 2-3 VIEWS: CPT

## 2017-08-21 PROCEDURE — 88305 TISSUE EXAM BY PATHOLOGIST: CPT

## 2017-08-21 PROCEDURE — 97535 SELF CARE MNGMENT TRAINING: CPT

## 2017-08-21 PROCEDURE — 97110 THERAPEUTIC EXERCISES: CPT

## 2017-08-21 PROCEDURE — C1889: CPT

## 2017-08-21 PROCEDURE — 97161 PT EVAL LOW COMPLEX 20 MIN: CPT

## 2017-08-21 PROCEDURE — 97116 GAIT TRAINING THERAPY: CPT

## 2017-08-21 PROCEDURE — 97165 OT EVAL LOW COMPLEX 30 MIN: CPT

## 2017-08-21 PROCEDURE — 80048 BASIC METABOLIC PNL TOTAL CA: CPT

## 2017-08-21 PROCEDURE — 76000 FLUOROSCOPY <1 HR PHYS/QHP: CPT

## 2017-08-21 PROCEDURE — 99233 SBSQ HOSP IP/OBS HIGH 50: CPT

## 2017-08-21 PROCEDURE — 97530 THERAPEUTIC ACTIVITIES: CPT

## 2017-08-21 PROCEDURE — 85027 COMPLETE CBC AUTOMATED: CPT

## 2017-08-21 RX ORDER — PANTOPRAZOLE SODIUM 20 MG/1
1 TABLET, DELAYED RELEASE ORAL
Qty: 42 | Refills: 0 | OUTPATIENT
Start: 2017-08-21

## 2017-08-21 RX ORDER — ASPIRIN/CALCIUM CARB/MAGNESIUM 324 MG
2 TABLET ORAL
Qty: 152 | Refills: 0
Start: 2017-08-21 | End: 2017-09-28

## 2017-08-21 RX ORDER — PANTOPRAZOLE SODIUM 20 MG/1
1 TABLET, DELAYED RELEASE ORAL
Qty: 38 | Refills: 0
Start: 2017-08-21 | End: 2017-09-28

## 2017-08-21 RX ORDER — ASPIRIN/CALCIUM CARB/MAGNESIUM 324 MG
1 TABLET ORAL
Qty: 0 | Refills: 0 | DISCHARGE
Start: 2017-08-21 | End: 2017-09-28

## 2017-08-21 RX ORDER — MAGNESIUM HYDROXIDE 400 MG/1
30 TABLET, CHEWABLE ORAL
Qty: 0 | Refills: 0 | DISCHARGE
Start: 2017-08-21

## 2017-08-21 RX ORDER — OXYCODONE HYDROCHLORIDE 5 MG/1
1 TABLET ORAL
Qty: 80 | Refills: 0 | OUTPATIENT
Start: 2017-08-21

## 2017-08-21 RX ORDER — DOCUSATE SODIUM 100 MG
1 CAPSULE ORAL
Qty: 0 | Refills: 0 | DISCHARGE
Start: 2017-08-21

## 2017-08-21 RX ORDER — SENNA PLUS 8.6 MG/1
2 TABLET ORAL
Qty: 0 | Refills: 0 | DISCHARGE
Start: 2017-08-21

## 2017-08-21 RX ORDER — POLYETHYLENE GLYCOL 3350 17 G/17G
17 POWDER, FOR SOLUTION ORAL
Qty: 0 | Refills: 0 | DISCHARGE
Start: 2017-08-21

## 2017-08-21 RX ORDER — ASPIRIN/CALCIUM CARB/MAGNESIUM 324 MG
2 TABLET ORAL
Qty: 168 | Refills: 0 | OUTPATIENT
Start: 2017-08-21

## 2017-08-21 RX ORDER — OXYCODONE HYDROCHLORIDE 5 MG/1
1 TABLET ORAL
Qty: 80 | Refills: 0
Start: 2017-08-21

## 2017-08-21 RX ADMIN — CELECOXIB 200 MILLIGRAM(S): 200 CAPSULE ORAL at 09:11

## 2017-08-21 RX ADMIN — Medication 100 MILLIGRAM(S): at 05:31

## 2017-08-21 RX ADMIN — CELECOXIB 200 MILLIGRAM(S): 200 CAPSULE ORAL at 16:21

## 2017-08-21 RX ADMIN — PANTOPRAZOLE SODIUM 40 MILLIGRAM(S): 20 TABLET, DELAYED RELEASE ORAL at 05:32

## 2017-08-21 RX ADMIN — Medication 100 MILLIGRAM(S): at 12:25

## 2017-08-21 RX ADMIN — CELECOXIB 200 MILLIGRAM(S): 200 CAPSULE ORAL at 09:12

## 2017-08-21 RX ADMIN — Medication 240 MILLIGRAM(S): at 05:31

## 2017-08-21 RX ADMIN — Medication 2.5 MILLIGRAM(S): at 05:31

## 2017-08-21 RX ADMIN — Medication 1000 MILLIGRAM(S): at 12:25

## 2017-08-21 RX ADMIN — DONEPEZIL HYDROCHLORIDE 10 MILLIGRAM(S): 10 TABLET, FILM COATED ORAL at 12:26

## 2017-08-21 RX ADMIN — Medication 1000 MILLIGRAM(S): at 05:31

## 2017-08-21 RX ADMIN — CELECOXIB 200 MILLIGRAM(S): 200 CAPSULE ORAL at 16:20

## 2017-08-21 RX ADMIN — Medication 162 MILLIGRAM(S): at 09:11

## 2017-08-21 NOTE — PROGRESS NOTE ADULT - PROBLEM SELECTOR PLAN 1
pain meds as per anesthesia.  PT/OT.  DVT prophylaxis.  DVT ppx: [ ]ASA81 [x ] [id number] [ ] Lovenox [ ] Coumadin   [ ] Eliquis [  ] Heparin  Dispo:     Home [ ]     Acute Rehab [ ]     JOSE [ ]     TBD [x ]
pain meds as per anesthesia.  PT/OT.  DVT prophylaxis.  DVT ppx: [ ]ASA81 [x ] [id number] [ ] Lovenox [ ] Coumadin   [ ] Eliquis [  ] Heparin  Dispo:     Home [ ]     Acute Rehab [ ]     JOSE [ ]     TBD [x ]
pain meds as per anesthesia.  PT/OT.  DVT prophylaxis.  DVT ppx: [ ]ASA81 [ ] [id number] [ ] Lovenox [ ] Coumadin   [ ] Eliquis [  ] Heparin  Dispo:     Home [ ]     Acute Rehab [ ]     JOSE [ ]     TBD [x ]

## 2017-08-21 NOTE — PROGRESS NOTE ADULT - PROBLEM SELECTOR PROBLEM 6
CKD (chronic kidney disease) stage 2, GFR 60-89 ml/min
CKD (chronic kidney disease) stage 2, GFR 60-89 ml/min

## 2017-08-21 NOTE — PROGRESS NOTE ADULT - SUBJECTIVE AND OBJECTIVE BOX
ORTHOPEDIC PA PROGRESS NOTE  SANKET HATCH      77y Male                                                                                                                               POD # 1       STATUS POST:               Pre-Op Dx: Primary osteoarthritis of right hip    Post-Op Dx:  Primary osteoarthritis of right hip    Procedure: Hip replacement, total, right: Anterior by Dr. Mojica 8/18/17                                                Pain (0-10):  Pt reports 1/10 pain to right hip controlled with medication. Pt denies any CP, SOB, fever, chills, numbness/tingling. Pt recently had sanabria removed and is pending trial void.  Current Pain Management:   [ x] Po Analgesics [x ] IM /IV Anagesics     T(F): 98.2  HR: 80  BP: 96/61  RR: 16  SpO2: 100%                         11.6   9.7   )-----------( 174      ( 17 Aug 2017 20:21 )             35.8         08-17    138  |  105  |  18  ----------------------------<  189<H>  4.8   |  28  |  0.79    Ca    8.8      17 Aug 2017 20:21      Physical Exam :    -   Dressing C/D/I.   -   Hemovac x 2 intact  -   Distal Neurovascular status intact grossly.   -   Warm well perfused; capillary refill <3 seconds   -   (+)EHL/FHL   -   (+) Sensation to light touch  -   (-) Calf tenderness Bilaterally    A/P: 77y Male s/p Hip replacement, total, right: Anterior by Dr. Mojica 8/18/17     -   Ortho Stable  -   Continue anterior hip precautions  -   Continue PT/OT  -   f/u am labs  -   continue incentive spirometer  -   Pain control   -   Medicine to follow  -   DVT ppx:     [x ]SCDs     [ x] ASA    -   Weight bearing status:  WBAT [x ]         -  Dispo:          JOSE [x ]
Patient is a 77y old  Male who presents with a chief complaint of "right hip replacement" (17 Aug 2017 16:41)      Subjective: feels well. pain tolerable.  reports frequent awakening by staff at night.    MEDICATIONS  (STANDING):  lactated ringers. 1000 milliLiter(s) (100 mL/Hr) IV Continuous <Continuous>  pantoprazole    Tablet 40 milliGRAM(s) Oral before breakfast  senna 2 Tablet(s) Oral at bedtime  docusate sodium 100 milliGRAM(s) Oral three times a day  donepezil 10 milliGRAM(s) Oral daily  verapamil  milliGRAM(s) Oral daily  atorvastatin 20 milliGRAM(s) Oral at bedtime  aspirin enteric coated 162 milliGRAM(s) Oral every 12 hours  celecoxib 200 milliGRAM(s) Oral two times a day with meals  acetaminophen   Tablet 1000 milliGRAM(s) Oral every 8 hours    MEDICATIONS  (PRN):  aluminum hydroxide/magnesium hydroxide/simethicone Suspension 30 milliLiter(s) Oral four times a day PRN Indigestion  ondansetron Injectable 4 milliGRAM(s) IV Push every 6 hours PRN Nausea and/or Vomiting  polyethylene glycol 3350 17 Gram(s) Oral daily PRN Constipation  magnesium hydroxide Suspension 30 milliLiter(s) Oral daily PRN Constipation  LORazepam     Tablet 1 milliGRAM(s) Oral four times a day PRN Anxiety  HYDROmorphone  Injectable 0.5 milliGRAM(s) IV Push every 3 hours PRN Severe Pain (7 - 10)  oxyCODONE    IR 5 milliGRAM(s) Oral every 3 hours PRN Mild Pain (1 - 3)  oxyCODONE    IR 10 milliGRAM(s) Oral every 3 hours PRN Moderate Pain (4 - 6)  benzocaine 15 mG/menthol 3.6 mG Lozenge 1 Lozenge Oral two times a day PRN Sore Throat      Allergies    No Known Allergies    Intolerances        REVIEW OF SYSTEMS:  negative unless otherwise specified above.    Vital Signs Last 24 Hrs  T(C): 36.7 (18 Aug 2017 11:19), Max: 36.8 (17 Aug 2017 23:00)  T(F): 98 (18 Aug 2017 11:19), Max: 98.3 (17 Aug 2017 23:00)  HR: 66 (18 Aug 2017 11:19) (52 - 80)  BP: 107/60 (18 Aug 2017 11:19) (96/61 - 155/58)  BP(mean): --  RR: 16 (18 Aug 2017 11:19) (12 - 18)  SpO2: 98% (18 Aug 2017 11:19) (98% - 100%)    PHYSICAL EXAM:    GENERAL: No apparent distress, well-groomed, well-developed  HEAD:  Atraumatic, Normocephalic  EYES: conjunctiva and sclera clear  ENMT: Moist mucous membranes  NECK: Supple, No Jugular venous distension  NERVOUS SYSTEM:  Alert & Oriented X3, Good concentration; Bilateral lower extremity mobile, sensation to light touch intact  CHEST/LUNG: Clear to auscultation bilaterally; No rales, rhonchi, wheezing, or rubs  HEART: Regular rate and rhythm; No murmurs, rubs, or gallops  ABDOMEN: Soft, Nontender, Nondistended; Bowel sounds present  EXTREMITIES:  2+ Peripheral Pulses, No clubbing or cyanosis  LYMPH: No lymphadenopathy noted  SKIN: No rashes or lesions  INCISION:  Dressing dry and intact        LABS:   Hematocrit: 34.0 % <L> (08-18 @ 07:52)  Platelet Count - Automated: 183 K/uL (08-18 @ 07:52)  RBC Count: 3.57 M/uL <L> (08-18 @ 07:52)  Hemoglobin: 11.3 g/dL <L> (08-18 @ 07:52)  WBC Count: 12.7 K/uL <H> (08-18 @ 07:52)  Hemoglobin: 11.6 g/dL <L> (08-17 @ 20:21)  Hematocrit: 35.8 % <L> (08-17 @ 20:21)  RBC Count: 3.72 M/uL <L> (08-17 @ 20:21)  Platelet Count - Automated: 174 K/uL (08-17 @ 20:21)  WBC Count: 9.7 K/uL (08-17 @ 20:21)      08-18    139  |  104  |  15  ----------------------------<  123<H>  4.0   |  26  |  1.23    Ca    9.1      18 Aug 2017 07:52          IMAGING: images reviewed personally      Consultant Notes Reviewed and Care Discussed with relevant Consultants/Other Providers.
Patient medication calendar was done with detailed verbal instruction of the medications and the possible side effects.  DVT prophylaxis;    mg PO twice daily for 42 days.  Celebrex 200mg twice daily with meals for 21 days for HO prevention.  Pantoprazole 40 mg PO daily for stomach protection.  Acetamino 1000 mg twice daily for pain.  Senna, bisacodyl, or Miralax PRN for treatment of Docusate 100mg TID while taking narcotic
Patient was walking with PT and turned quickly. There was an audible "pop" in the right hip area without any associated pain. Patient was able to ambulate another 40 feet without any further difficulty. Patient was evaluated at bedside and there was no indication of injury to the operative site. However, out of caution, xrays of the right hip were ordered. Xrays were reviewed by me and official report by radiologist is in the EMR. There is no fracture or dislocation seen. Prosthesis is in place in good position. Findings were discussed with patient who expressed relief that there were no findings.
Post Op     SANKET HATCH      77y        Male                                                                                                                 T(C): 36.8 (08-20-17 @ 07:32), Max: 36.9 (08-19-17 @ 23:00)  HR: 74 (08-20-17 @ 07:32) (56 - 76)  BP: 149/81 (08-20-17 @ 07:32) (98/59 - 161/76)  RR: 15 (08-20-17 @ 07:32) (15 - 16)  SpO2: 99% (08-20-17 @ 07:32) (96% - 99%)  Wt(kg): --    S/P   right total hip replacement    Patient denies shortness of breath, chest pain, dyspnea, No complaints  Pain is2 /10    Physical Exam    Extremity: Bilaterally:  No holmon                                           No Cord                                          PAS on                                          Neurovascular intact                                          Motor intact EHL/FHL                                          Sensation intact                                          Pulses intact DP/PT                                         Calves Soft                                         Dressing Clean / Dry / Intact changed  left  open to  air                                         Capillary refill with 5 seconds                          10.5   8.6   )-----------( 161      ( 20 Aug 2017 07:03 )             31.9       08-20    142  |  107  |  19  ----------------------------<  91  4.6   |  29  |  0.82    Ca    9.0      20 Aug 2017 07:03        A/P  -- S/P right total  hip replacement  anterior    -  Medicine To Follow   - DVT prophylaxis PAS/ asa  - HO celebrex  - Analgesia  - Incentive Spirometry  - Discharge Planning  - Safety Precautions  -  CBC , BMP daily
SANKET HATCH                                                                [id number]                                                      Allergies---No Known Allergies         Pt is a 77y year old Male s/p left THR.   Pain is 2/10.   Tolerating the diet.   The patient is A&O x 3, resting comfortably in bed, with no complaints.   Denies chest pain / shortness of breath / dyspnea / nausea / vomiting / headaches or light headed ness.       Vital Signs Last 24 Hrs  T(F): 97.9 (08-21-17 @ 07:33), Max: 97.9 (08-20-17 @ 23:03)  HR: 69 (08-21-17 @ 07:33)  BP: 144/86 (08-21-17 @ 07:33)  RR: 16 (08-21-17 @ 07:33)  SpO2: 97% (08-21-17 @ 07:33)    I&O's Detail    20 Aug 2017 07:01  -  21 Aug 2017 07:00  --------------------------------------------------------  IN:  Total IN: 0 mL    OUT:    Voided: 203 mL  Total OUT: 203 mL    Total NET: -203 mL            PE:   Right Lower Extremity:   Dressing is C/D/I.   The wound is C/D/I.   The wound is closed with sutures.   NO redness, swelling, heat, discharge or any other evidence of infection superficial or deep is noted.   NVI.   Sensation intact to light touch distally.   No gross evidence of motor deficit.   EHL/FHL/TA intact.   +2 DP/PT pulses.   Capillary refill < 2 seconds.   Toes are pink and mobile.   Negative calf tenderness.   No evidence of impending compartment syndrome.   PAS on.                    A:   Pt is a 77y year old Male S/P right total hip replacement, Post Op Day #4        Plan:    - Follow up with Medicine    - OOB with PT/OT   - DVT ppx = PAS +  aspirin enteric coated 162 milliGRAM(s) Oral every 12 hours   - Ant Hip precautions   - Pain control    - Incentive spirometry   - Discharge Planning for today                                                                                                                                                                           Rick ARCE
SANKET HATCH                                                                [id number]                                                      Allergies---No Known Allergies         Pt is a 77y year old Male s/p right THR.   Pain is 2/10.   Tolerating the diet.   The patient is A&O x 3, resting comfortably in bed, with no complaints.   Denies chest pain / shortness of breath / dyspnea / nausea / vomiting / headaches or light headed ness.       Vital Signs Last 24 Hrs  T(F): 97.5 (08-19-17 @ 11:18), Max: 98 (08-19-17 @ 03:00)  HR: 69 (08-19-17 @ 11:18)  BP: 95/59 (08-19-17 @ 11:18)  RR: 17 (08-19-17 @ 11:18)  SpO2: 96% (08-19-17 @ 11:18)    I&O's Detail    18 Aug 2017 07:01  -  19 Aug 2017 07:00  --------------------------------------------------------  IN:  Total IN: 0 mL    OUT:    Accordian: 195 mL    Voided: 1450 mL  Total OUT: 1645 mL    Total NET: -1645 mL            PE:   Right Lower Extremity:   Dressing is C/D/I.   The dressing was changed with no complications.   The wound is C/D/I.   The wound is closed with sutures.   NO redness, swelling, heat, discharge or any other evidence of infection superficial or deep is noted.   NVI.   Sensation intact to light touch distally.   No gross evidence of motor deficit.   EHL/FHL/TA intact.   +2 DP/PT pulses.   Capillary refill < 2 seconds.   Toes are pink and mobile.   Negative calf tenderness.   No evidence of impending compartment syndrome.   PAS on.                             10.7   10.8  )--------------( 150                          08-19-17 @ 06:50               32.8       142   |  107  |  18  -----------------------------<  95                  08-19-17 @ 06:50  4.2    |  29    |  0.75    Ca    8.9                A:   Pt is a 77y year old Male S/P right total hip replacement, Post Op Day #2        Plan:    - Follow up with Medicine    - OOB with PT/OT   - DVT ppx = PAS +  aspirin enteric coated 162 milliGRAM(s) Oral every 12 hours   - Ant Hip precautions   - Pain control    - Incentive spirometry   - Labs in A.M.   - Discharge Planning                                                                                                                                                                           Rick Bejarano RPA-C
[facility]ANK[facility] [place] [id number]    Pt is a 77y year old Male s/p right THR. pain is 0/10. Anesthesia was general. Denies chest pain/shortness of breath/nausea/vomitting.     T(C): 36.4 (08-17-17 @ 14:26), Max: 36.6 (08-17-17 @ 09:37)  HR: 60 (08-17-17 @ 15:11) (60 - 69)  BP: 155/58 (08-17-17 @ 14:41) (136/88 - 155/58)  RR: 12 (08-17-17 @ 15:11) (11 - 15)  SpO2: 99% (08-17-17 @ 15:11) (99% - 100%)  Wt(kg): --        PE:   RLE: Dressing CDI, SILT distally, (+2) DP/PT pulses, EHL/FHL/TA intact, Capillary refill < 2 seconds.  HV in place, PAS on.     A: 77y year old Male s/p right THR POD#0    Plan:   -DVT ppx  -PT/OT = OOB  -Hip dislocation precautions  -Pain control   -Medicine to follow   -continue antibiotics as per SCIP protocol  -Follow up Labs  -Incentive spirometry, continue use of PAS
Patient is a 77y old  Male who presents with a chief complaint of "right hip replacement" (17 Aug 2017 16:41)      INTERVAL HPI/OVERNIGHT EVENTS:  Pt is seen and examined.  pain is controlled.    Pain Location & Control:     MEDICATIONS  (STANDING):  lactated ringers. 1000 milliLiter(s) (100 mL/Hr) IV Continuous <Continuous>  pantoprazole    Tablet 40 milliGRAM(s) Oral before breakfast  senna 2 Tablet(s) Oral at bedtime  docusate sodium 100 milliGRAM(s) Oral three times a day  donepezil 10 milliGRAM(s) Oral daily  verapamil  milliGRAM(s) Oral daily  enalapril 2.5 milliGRAM(s) Oral daily  atorvastatin 20 milliGRAM(s) Oral at bedtime  aspirin enteric coated 162 milliGRAM(s) Oral every 12 hours  celecoxib 200 milliGRAM(s) Oral two times a day with meals  acetaminophen   Tablet 1000 milliGRAM(s) Oral every 8 hours    MEDICATIONS  (PRN):  aluminum hydroxide/magnesium hydroxide/simethicone Suspension 30 milliLiter(s) Oral four times a day PRN Indigestion  ondansetron Injectable 4 milliGRAM(s) IV Push every 6 hours PRN Nausea and/or Vomiting  polyethylene glycol 3350 17 Gram(s) Oral daily PRN Constipation  bisacodyl Suppository 10 milliGRAM(s) Rectal daily PRN If no bowel movement by POD#2  magnesium hydroxide Suspension 30 milliLiter(s) Oral daily PRN Constipation  LORazepam     Tablet 1 milliGRAM(s) Oral four times a day PRN Anxiety  HYDROmorphone  Injectable 0.5 milliGRAM(s) IV Push every 3 hours PRN Severe Pain (7 - 10)  oxyCODONE    IR 5 milliGRAM(s) Oral every 3 hours PRN Mild Pain (1 - 3)  oxyCODONE    IR 10 milliGRAM(s) Oral every 3 hours PRN Moderate Pain (4 - 6)  benzocaine 15 mG/menthol 3.6 mG Lozenge 1 Lozenge Oral two times a day PRN Sore Throat      Allergies    No Known Allergies    Intolerances            Vital Signs Last 24 Hrs  T(C): 36.6 (19 Aug 2017 07:21), Max: 36.7 (18 Aug 2017 11:19)  T(F): 97.8 (19 Aug 2017 07:21), Max: 98 (18 Aug 2017 11:19)  HR: 68 (19 Aug 2017 07:21) (58 - 81)  BP: 123/75 (19 Aug 2017 07:21) (107/60 - 154/79)  BP(mean): --  RR: 16 (19 Aug 2017 07:21) (16 - 18)  SpO2: 96% (19 Aug 2017 07:21) (96% - 98%)        LABS:                        10.7   10.8  )-----------( 150      ( 19 Aug 2017 06:50 )             32.8     19 Aug 2017 06:50    142    |  107    |  18     ----------------------------<  95     4.2     |  29     |  0.75     Ca    8.9        19 Aug 2017 06:50          RADIOLOGY & ADDITIONAL TESTS:    Imaging Personally Reviewed:  [ ] YES  [ ] NO    Consultant(s) Notes Reviewed:  [x ] YES  [ ] NO    Care Discussed with Consultants/Other Providers [x ] YES  [ ] NO
Patient is a 77y old  Male who presents with a chief complaint of "right hip replacement" (17 Aug 2017 16:41)      INTERVAL HPI/OVERNIGHT EVENTS:  Pt is seen and examined.  pain is controlled. sitting on chair.    Pain Location & Control:     MEDICATIONS  (STANDING):  lactated ringers. 1000 milliLiter(s) (100 mL/Hr) IV Continuous <Continuous>  pantoprazole    Tablet 40 milliGRAM(s) Oral before breakfast  senna 2 Tablet(s) Oral at bedtime  docusate sodium 100 milliGRAM(s) Oral three times a day  donepezil 10 milliGRAM(s) Oral daily  verapamil  milliGRAM(s) Oral daily  enalapril 2.5 milliGRAM(s) Oral daily  atorvastatin 20 milliGRAM(s) Oral at bedtime  aspirin enteric coated 162 milliGRAM(s) Oral every 12 hours  celecoxib 200 milliGRAM(s) Oral two times a day with meals  acetaminophen   Tablet 1000 milliGRAM(s) Oral every 8 hours    MEDICATIONS  (PRN):  aluminum hydroxide/magnesium hydroxide/simethicone Suspension 30 milliLiter(s) Oral four times a day PRN Indigestion  ondansetron Injectable 4 milliGRAM(s) IV Push every 6 hours PRN Nausea and/or Vomiting  polyethylene glycol 3350 17 Gram(s) Oral daily PRN Constipation  bisacodyl Suppository 10 milliGRAM(s) Rectal daily PRN If no bowel movement by POD#2  magnesium hydroxide Suspension 30 milliLiter(s) Oral daily PRN Constipation  LORazepam     Tablet 1 milliGRAM(s) Oral four times a day PRN Anxiety  HYDROmorphone  Injectable 0.5 milliGRAM(s) IV Push every 3 hours PRN Severe Pain (7 - 10)  oxyCODONE    IR 5 milliGRAM(s) Oral every 3 hours PRN Mild Pain (1 - 3)  oxyCODONE    IR 10 milliGRAM(s) Oral every 3 hours PRN Moderate Pain (4 - 6)  benzocaine 15 mG/menthol 3.6 mG Lozenge 1 Lozenge Oral two times a day PRN Sore Throat      Allergies    No Known Allergies    Intolerances            Vital Signs Last 24 Hrs  T(C): 36.8 (20 Aug 2017 07:32), Max: 36.9 (19 Aug 2017 23:00)  T(F): 98.3 (20 Aug 2017 07:32), Max: 98.5 (19 Aug 2017 23:00)  HR: 74 (20 Aug 2017 07:32) (56 - 76)  BP: 149/81 (20 Aug 2017 07:32) (95/59 - 161/76)  BP(mean): --  RR: 15 (20 Aug 2017 07:32) (15 - 17)  SpO2: 99% (20 Aug 2017 07:32) (96% - 99%)        LABS:                        10.5   8.6   )-----------( 161      ( 20 Aug 2017 07:03 )             31.9     20 Aug 2017 07:03    142    |  107    |  19     ----------------------------<  91     4.6     |  29     |  0.82     Ca    9    RADIOLOGY & ADDITIONAL TESTS:    Imaging Personally Reviewed:  [ ] YES  [ ] NO    Consultant(s) Notes Reviewed:  [x ] YES  [ ] NO    Care Discussed with Consultants/Other Providers [ x] YES  [ ] NO
Patient is a 77y old  Male who presents with a chief complaint of "right hip replacement" (17 Aug 2017 16:41)      INTERVAL HPI/OVERNIGHT EVENTS:    Pain Location & Control:   pain is controlled.   feels better.    MEDICATIONS  (STANDING):  lactated ringers. 1000 milliLiter(s) (100 mL/Hr) IV Continuous <Continuous>  pantoprazole    Tablet 40 milliGRAM(s) Oral before breakfast  senna 2 Tablet(s) Oral at bedtime  docusate sodium 100 milliGRAM(s) Oral three times a day  donepezil 10 milliGRAM(s) Oral daily  verapamil  milliGRAM(s) Oral daily  enalapril 2.5 milliGRAM(s) Oral daily  atorvastatin 20 milliGRAM(s) Oral at bedtime  aspirin enteric coated 162 milliGRAM(s) Oral every 12 hours  celecoxib 200 milliGRAM(s) Oral two times a day with meals  acetaminophen   Tablet 1000 milliGRAM(s) Oral every 8 hours    MEDICATIONS  (PRN):  aluminum hydroxide/magnesium hydroxide/simethicone Suspension 30 milliLiter(s) Oral four times a day PRN Indigestion  ondansetron Injectable 4 milliGRAM(s) IV Push every 6 hours PRN Nausea and/or Vomiting  polyethylene glycol 3350 17 Gram(s) Oral daily PRN Constipation  bisacodyl Suppository 10 milliGRAM(s) Rectal daily PRN If no bowel movement by POD#2  magnesium hydroxide Suspension 30 milliLiter(s) Oral daily PRN Constipation  LORazepam     Tablet 1 milliGRAM(s) Oral four times a day PRN Anxiety  HYDROmorphone  Injectable 0.5 milliGRAM(s) IV Push every 3 hours PRN Severe Pain (7 - 10)  oxyCODONE    IR 5 milliGRAM(s) Oral every 3 hours PRN Mild Pain (1 - 3)  oxyCODONE    IR 10 milliGRAM(s) Oral every 3 hours PRN Moderate Pain (4 - 6)  benzocaine 15 mG/menthol 3.6 mG Lozenge 1 Lozenge Oral two times a day PRN Sore Throat      Allergies    No Known Allergies    Intolerances          Vital Signs Last 24 Hrs  T(C): 36.6 (21 Aug 2017 07:33), Max: 36.7 (20 Aug 2017 15:00)  T(F): 97.9 (21 Aug 2017 07:33), Max: 98.1 (20 Aug 2017 15:00)  HR: 69 (21 Aug 2017 07:33) (67 - 86)  BP: 144/86 (21 Aug 2017 07:33) (104/64 - 168/97)  BP(mean): --  RR: 16 (21 Aug 2017 07:33) (15 - 16)  SpO2: 97% (21 Aug 2017 07:33) (96% - 97%)        I&O's Detail    20 Aug 2017 07:01  -  21 Aug 2017 07:00  --------------------------------------------------------  IN:  Total IN: 0 mL    OUT:    Voided: 203 mL  Total OUT: 203 mL    Total NET: -203 mL          LABS:      Ca    9.0        20 Aug 2017 07:03        RADIOLOGY & ADDITIONAL TESTS:    Imaging Personally Reviewed:  [ ] YES  [ ] NO    Consultant(s) Notes Reviewed:  [x ] YES  [ ] NO    Care Discussed with Consultants/Other Providers [x ] YES  [ ] NO

## 2017-08-21 NOTE — PROGRESS NOTE ADULT - PROBLEM SELECTOR PROBLEM 1
Aftercare following right hip joint replacement surgery

## 2017-08-21 NOTE — PROGRESS NOTE ADULT - ASSESSMENT
77 male
78 male
77 male    1. right THR: Opiates prn + bowel regimen.  Physical Therapy evaluation.    2. anxiety/insomnia: ativan prn. high risk for delirium given advanced age.    3. HTN: Blood pressure meds with hold parameters     4. mild YOVANI/ATN: encouraged oral hydration. Monitor BMP daily.    5. dvt prophylaxis per orthopedic protocol     6. dispo: no Medical contra-indication to discharge pending Physical Therapy/Orthopedic clearance.   discussed with orthopedic PA regarding plan of care.
77 male

## 2017-09-13 NOTE — DISCHARGE NOTE ADULT - CARE PLAN
Principal Discharge DX:	Primary osteoarthritis of right hip  Goal:	Improve ambulation, ADLs and quality of life  Instructions for follow-up, activity and diet:	Physical Therapy/Occupational Therapy for: Ambulation, transfers, stairs, & ADLs, isometrics.  Full weight bearing on both legs; Walker/cane use as instructed by Physical therapy/Occupational therapy. Anterior Total Hip Replacement precautions for  6 weeks: No straight leg raise; No external rotation of hip when extended-standing or lying flat; No hyperextension of hip when standing (kickback).   Ice packs to hip for 30 min. every 3 hours and after physical therapy.   Keep incision clean and dry. May shower 5 days after surgery if no drainage from incision.  Prineo tape/suture removal on/near after Post Op Day # 14 in rehab facility / Surgeon's office.  HO prophylaxis: Celebrex 200mg twice a day for 21 days total How Severe Is Your Rosacea?: moderate Is This A New Presentation, Or A Follow-Up?: Rosacea Additional History: Patient advised today is a good day regarding his rosacea

## 2017-11-20 ENCOUNTER — APPOINTMENT (OUTPATIENT)
Dept: ORTHOPEDIC SURGERY | Facility: CLINIC | Age: 77
End: 2017-11-20
Payer: MEDICARE

## 2017-11-20 VITALS — BODY MASS INDEX: 22.16 KG/M2 | HEIGHT: 65 IN | WEIGHT: 133 LBS

## 2017-11-20 PROCEDURE — 99024 POSTOP FOLLOW-UP VISIT: CPT

## 2018-02-07 ENCOUNTER — APPOINTMENT (OUTPATIENT)
Dept: ORTHOPEDIC SURGERY | Facility: CLINIC | Age: 78
End: 2018-02-07

## 2018-07-17 PROBLEM — I10 ESSENTIAL (PRIMARY) HYPERTENSION: Chronic | Status: ACTIVE | Noted: 2017-08-03

## 2018-07-17 PROBLEM — E78.00 PURE HYPERCHOLESTEROLEMIA, UNSPECIFIED: Chronic | Status: ACTIVE | Noted: 2017-08-03

## 2018-07-17 PROBLEM — F41.9 ANXIETY DISORDER, UNSPECIFIED: Chronic | Status: ACTIVE | Noted: 2017-08-03

## 2018-07-17 PROBLEM — Z87.39 PERSONAL HISTORY OF OTHER DISEASES OF THE MUSCULOSKELETAL SYSTEM AND CONNECTIVE TISSUE: Chronic | Status: ACTIVE | Noted: 2017-08-03

## 2018-07-18 ENCOUNTER — APPOINTMENT (OUTPATIENT)
Dept: ORTHOPEDIC SURGERY | Facility: CLINIC | Age: 78
End: 2018-07-18
Payer: MEDICARE

## 2018-07-18 VITALS — BODY MASS INDEX: 23.27 KG/M2 | WEIGHT: 138 LBS | HEIGHT: 64.5 IN

## 2018-07-18 PROCEDURE — 99214 OFFICE O/P EST MOD 30 MIN: CPT

## 2018-07-18 PROCEDURE — 73502 X-RAY EXAM HIP UNI 2-3 VIEWS: CPT | Mod: RT

## 2018-08-08 ENCOUNTER — APPOINTMENT (OUTPATIENT)
Dept: ORTHOPEDIC SURGERY | Facility: CLINIC | Age: 78
End: 2018-08-08
Payer: MEDICARE

## 2018-08-08 PROCEDURE — 99213 OFFICE O/P EST LOW 20 MIN: CPT

## 2018-08-17 ENCOUNTER — OTHER (OUTPATIENT)
Age: 78
End: 2018-08-17

## 2021-03-05 NOTE — H&P PST ADULT - NSANTHNECKRD_ENT_A_CORE
Future Appointments:  Future Appointments   Date Time Provider Joe Levine   6/15/2021  8:45 AM Romeo Solis MD [facility] BS AMB        Last Appointment With Me:  12/14/2020     Requested Prescriptions     Pending Prescriptions Disp Refills    losartan-hydroCHLOROthiazide (HYZAAR) 100-25 mg per tablet [Pharmacy Med Name: LOSARTAN/HCTZ 100/25MG TABLETS] 90 Tab 0     Sig: TAKE 1 TABLET BY MOUTH EVERY DAY    montelukast (SINGULAIR) 10 mg tablet [Pharmacy Med Name: MONTELUKAST 10MG TABLETS] 90 Tab 0     Sig: TAKE 1 TABLET BY MOUTH EVERY DAY No

## 2021-09-29 ENCOUNTER — TRANSCRIPTION ENCOUNTER (OUTPATIENT)
Age: 81
End: 2021-09-29

## 2021-09-29 NOTE — ASU PATIENT PROFILE, ADULT - NSICDXPASTSURGICALHX_GEN_ALL_CORE_FT
PAST SURGICAL HISTORY:  H/O eye surgery drooping upper eyelids 2013    S/P inguinal hernia repair     S/P tonsillectomy as a child     PAST SURGICAL HISTORY:  H/O eye surgery drooping upper eyelids 2013 BILAT    H/O hernia repair RIGHT    History of hip replacement RIGHT    S/P tonsillectomy as a child

## 2021-09-29 NOTE — ASU PATIENT PROFILE, ADULT - NSICDXPASTMEDICALHX_GEN_ALL_CORE_FT
PAST MEDICAL HISTORY:  Anxiety and depression     History of osteoarthritis     Hypercholesteremia     Hypertension     Inguinal hernia left

## 2021-09-30 ENCOUNTER — TRANSCRIPTION ENCOUNTER (OUTPATIENT)
Age: 81
End: 2021-09-30

## 2021-09-30 ENCOUNTER — RESULT REVIEW (OUTPATIENT)
Age: 81
End: 2021-09-30

## 2021-09-30 ENCOUNTER — OUTPATIENT (OUTPATIENT)
Dept: INPATIENT UNIT | Facility: HOSPITAL | Age: 81
LOS: 1 days | Discharge: HOME | End: 2021-09-30
Payer: MEDICARE

## 2021-09-30 VITALS
DIASTOLIC BLOOD PRESSURE: 83 MMHG | RESPIRATION RATE: 16 BRPM | WEIGHT: 146.17 LBS | OXYGEN SATURATION: 97 % | HEIGHT: 64 IN | SYSTOLIC BLOOD PRESSURE: 128 MMHG | TEMPERATURE: 98 F | HEART RATE: 56 BPM

## 2021-09-30 VITALS — OXYGEN SATURATION: 100 % | HEART RATE: 50 BPM | SYSTOLIC BLOOD PRESSURE: 143 MMHG | DIASTOLIC BLOOD PRESSURE: 72 MMHG

## 2021-09-30 DIAGNOSIS — Z96.649 PRESENCE OF UNSPECIFIED ARTIFICIAL HIP JOINT: Chronic | ICD-10-CM

## 2021-09-30 DIAGNOSIS — Z90.89 ACQUIRED ABSENCE OF OTHER ORGANS: Chronic | ICD-10-CM

## 2021-09-30 DIAGNOSIS — Z98.890 OTHER SPECIFIED POSTPROCEDURAL STATES: Chronic | ICD-10-CM

## 2021-09-30 PROCEDURE — 64722 DECOMPRESSION UNSPEC NERVE: CPT

## 2021-09-30 PROCEDURE — C1781: CPT

## 2021-09-30 PROCEDURE — 88304 TISSUE EXAM BY PATHOLOGIST: CPT | Mod: 26

## 2021-09-30 PROCEDURE — 88304 TISSUE EXAM BY PATHOLOGIST: CPT

## 2021-09-30 PROCEDURE — 49505 PRP I/HERN INIT REDUC >5 YR: CPT | Mod: LT

## 2021-09-30 RX ORDER — HYDROMORPHONE HYDROCHLORIDE 2 MG/ML
0.25 INJECTION INTRAMUSCULAR; INTRAVENOUS; SUBCUTANEOUS ONCE
Refills: 0 | Status: DISCONTINUED | OUTPATIENT
Start: 2021-09-30 | End: 2021-09-30

## 2021-09-30 RX ORDER — DONEPEZIL HYDROCHLORIDE 10 MG/1
1 TABLET, FILM COATED ORAL
Qty: 0 | Refills: 0 | DISCHARGE

## 2021-09-30 RX ORDER — PHENELZINE SULFATE 15 MG/1
3 TABLET, FILM COATED ORAL
Qty: 0 | Refills: 0 | DISCHARGE

## 2021-09-30 RX ORDER — BUPIVACAINE 13.3 MG/ML
20 INJECTION, SUSPENSION, LIPOSOMAL INFILTRATION ONCE
Refills: 0 | Status: DISCONTINUED | OUTPATIENT
Start: 2021-09-30 | End: 2021-09-30

## 2021-09-30 RX ORDER — VERAPAMIL HCL 240 MG
0 CAPSULE, EXTENDED RELEASE PELLETS 24 HR ORAL
Qty: 0 | Refills: 0 | DISCHARGE

## 2021-09-30 RX ORDER — ACETAMINOPHEN 500 MG
1000 TABLET ORAL ONCE
Refills: 0 | Status: COMPLETED | OUTPATIENT
Start: 2021-09-30 | End: 2021-09-30

## 2021-09-30 RX ADMIN — Medication 400 MILLIGRAM(S): at 14:49

## 2021-09-30 RX ADMIN — Medication 1000 MILLIGRAM(S): at 15:48

## 2021-09-30 NOTE — DISCHARGE NOTE NURSING/CASE MANAGEMENT/SOCIAL WORK - PATIENT PORTAL LINK FT
You can access the FollowMyHealth Patient Portal offered by [facility] by registering at the following website: [email or web address]. By joining [employer]’s FollowMyHealth portal, you will also be able to view your health information using other applications (apps) compatible with our system.

## 2021-09-30 NOTE — ASU DISCHARGE PLAN (ADULT/PEDIATRIC) - CARE PROVIDER_API CALL
Adán Abreu  SURGERY  [address], [address] [address]  Phone: [phone]  Fax: [phone]  Follow Up Time: 2 weeks

## 2021-09-30 NOTE — PACU DISCHARGE NOTE - COMMENTS
Patient met all PACU criteria. Patient voided 125cc prior to discharge and patient tolerated clear liquid diet. Patient with minimal complaints of pain, tylenol 1gm given with good affect. Patient expressed a good understanding of discharge instructions regarding pain control, incision care and follow up appointment with MD> Faiza discontinued prior to discharge and patient escorted by RN to front of building to meet with family.

## 2021-09-30 NOTE — ASU DISCHARGE PLAN (ADULT/PEDIATRIC) - ASU DC SPECIAL INSTRUCTIONSFT
Please take Tylenol and Motrin to minimize your pain. Use ice packs as needed, 10 minutes on the incision and 10 minutes off. And take your prescribed percocet only for severe pain that is not controlled by over the counter medications.    Please wear jockey style underwear for increased support, do not wear boxers or boxer briefs.

## 2021-09-30 NOTE — BRIEF OPERATIVE NOTE - OPERATION/FINDINGS
Incision was made over the L inguinal ligament. Tissue was dissected down to the cord and sac structures. External ring was ligated and sac was dissected off the cord. Sac was reduced and mesh plug was inserted. Keyhole mesh was shown to the shelving edge with interrupted suture. External oblique was closed in running fashion. Skin was closed with deep dermals and running suture with dermabond.

## 2021-09-30 NOTE — ASU DISCHARGE PLAN (ADULT/PEDIATRIC) - CALL YOUR DOCTOR IF YOU HAVE ANY OF THE FOLLOWING:
Swelling that gets worse/Pain not relieved by Medications/Fever greater than (need to indicate Fahrenheit or Celsius)/Unable to urinate

## 2021-10-25 LAB — SURGICAL PATHOLOGY STUDY: SIGNIFICANT CHANGE UP

## 2022-05-16 NOTE — OCCUPATIONAL THERAPY INITIAL EVALUATION ADULT - BED MOBILITY/TRANSFERS, PREVIOUS LEVEL OF FUNCTION, OT EVAL
Pt noted to be very restless ,Per pt she has restless leg syndrome at home she takes 600 mg of gabapentin three times a day. Covering MD notified , Pt also takes alcohol last drink on 05/14 writer recommended Spencer, awaiting MDs response   independent

## 2023-04-24 PROBLEM — K40.90 UNILATERAL INGUINAL HERNIA, WITHOUT OBSTRUCTION OR GANGRENE, NOT SPECIFIED AS RECURRENT: Chronic | Status: ACTIVE | Noted: 2021-09-29

## 2023-05-10 ENCOUNTER — APPOINTMENT (OUTPATIENT)
Dept: ORTHOPEDIC SURGERY | Facility: CLINIC | Age: 83
End: 2023-05-10
Payer: MEDICARE

## 2023-05-10 VITALS — BODY MASS INDEX: 23.9 KG/M2 | HEIGHT: 64 IN | WEIGHT: 140 LBS

## 2023-05-10 PROCEDURE — 99213 OFFICE O/P EST LOW 20 MIN: CPT

## 2023-05-10 PROCEDURE — 73502 X-RAY EXAM HIP UNI 2-3 VIEWS: CPT

## 2023-05-10 NOTE — HISTORY OF PRESENT ILLNESS
[de-identified] : 83 year old male presents for follow up evaluation of s/p right total hip August 17, 2017 and a history of a hernia operation 2 years ago.  Patient reports groin pain. Patient states the pain is more of a feeling of discomfort. Patient reports that lifting his leg provokes groin pain . He states he recently received a recall letter, and is therefore here for re-evaluation of right hip. Denies any fever or chills. Patient denies any neurovascular symptoms. Patient states he plays tennis 3-4x a week. \par

## 2023-05-10 NOTE — ADDENDUM
[FreeTextEntry1] : I, Ana Maria Card, acted solely as a scribe for Dr. Kyler Mojica MD on this date 05/10/2023 .\par \par All medical record entries made by the Scribe were at my, Dr. Kyler Mojica MD , direction and personally dictated by me on 05/10/2023 . I have reviewed the chart and agree that the record accurately reflects my personal performance of the history, physical exam, assessment and plan. I have also personally directed, reviewed, and agreed with the chart.\par

## 2023-05-10 NOTE — PHYSICAL EXAM
[de-identified] : Constitutional: Well nourished , well developed and in no acute distress\par Psychiatric: Alert and oriented to time place and person.Appropriate affect .\par Skin: Head, neck, arms and lower extremities:no lesions or discoloration\par HEENT: Normocephalic, EOM intact, Nasal septum midline,\par Respiratory: Unlabored respirations,no audible wheezing ,no tachypnea, no cyanosis\par Cardiovascular: No leg swelling no ankle edema no JVD, pulse regular\par Vascular: No calf or thigh tenderness,\par Peripheral pulses: intact\par Lymphatics: No groin adenopathy,no lymphedema lower or upper extremities\par \par Right Hip\par Inspection/Palpation : no tenderness, no swelling, no deformity\par Leg Lengths: equal\par Passive Range of Motion: flexion 110  degrees, internal  20  degrees, external  50  degrees, abduction 45   degrees, adduction  45  degrees\par Strength: resisted hip flexion 5/5 provokes groin paib, resisted hip abduction 5/5\par Skin : no erythema, no ecchymosis\par Sensation : sensation to light touch intact\par Vascular Exam : no edema, no cyanosis, dorsalis pedis artery pulse 2+, posterior tibial artery pulse 2+\par \par Left Hip\par Inspection/Palpation : no tenderness, no swelling, no deformity\par Leg Lengths: equal\par Passive range of motion: flexion   degrees, internal    degrees, external    degrees, abduction    degrees, adduction    degrees\par Strength: resisted hip flexion 5/5, resisted hip abduction 5/5\par Skin : no erythema, no ecchymosis\par Sensation : sensation to light touch intact\par Vascular Exam : no edema, no cyanosis, dorsalis pedis artery pulse 2+, posterior tibial artery pulse 2+\par  [de-identified] : X-rays of the AP pelvis and AP, lateral of the right hip obtained today 05/10/2023 demonstrates total hip replacement components in good alignment, femoral heads well centered in acetabulum.\par

## 2023-05-10 NOTE — DISCUSSION/SUMMARY
[de-identified] : 83 year male old with s/p right total hip replacement on August 17, 2017. Overall, patient is doing well. Discussed [employer] recall.\par \par As for his groin pain, I recommend patient obtain an MRI of right hip to rule out d iliopsoas tendonitis. I will follow up with the patient afterwards to discuss the results.\par \par The patient understood and verbally agreed to the treatment plan. All of their questions were answered. The patient should call the office if they have any questions or experience worsening symptoms.\par

## 2023-05-15 NOTE — PHYSICAL THERAPY INITIAL EVALUATION ADULT - SIT-TO-STAND BALANCE
fair balance Adbry Pregnancy And Lactation Text: It is unknown if this medication will adversely affect pregnancy or breast feeding.

## 2023-08-08 ENCOUNTER — APPOINTMENT (OUTPATIENT)
Dept: OPHTHALMOLOGY | Facility: CLINIC | Age: 83
End: 2023-08-08

## 2023-08-14 ENCOUNTER — NON-APPOINTMENT (OUTPATIENT)
Age: 83
End: 2023-08-14

## 2023-08-30 ENCOUNTER — NON-APPOINTMENT (OUTPATIENT)
Age: 83
End: 2023-08-30

## 2023-09-13 ENCOUNTER — APPOINTMENT (OUTPATIENT)
Dept: ORTHOPEDIC SURGERY | Facility: CLINIC | Age: 83
End: 2023-09-13
Payer: MEDICARE

## 2023-09-13 DIAGNOSIS — Z96.641 PRESENCE OF RIGHT ARTIFICIAL HIP JOINT: ICD-10-CM

## 2023-09-13 PROCEDURE — 99213 OFFICE O/P EST LOW 20 MIN: CPT

## 2023-09-14 NOTE — PHYSICAL THERAPY INITIAL EVALUATION ADULT - ADL SKILLS, REHAB EVAL
PROVIDER:[TOKEN:[[id number]]] independent PROVIDER:[TOKEN:[[id number]]],PROVIDER:[TOKEN:[[id number]]]

## 2024-01-06 NOTE — BRIEF OPERATIVE NOTE - BRIEF OP NOTE DRAINS
Hemovac x 2, Izquierdo x 1
FAMILY HISTORY:  Father  Still living? Unknown  FH: cancer, Age at diagnosis: Age Unknown    Mother  Still living? No  FH: cancer, Age at diagnosis: Age Unknown

## 2024-05-09 ENCOUNTER — APPOINTMENT (OUTPATIENT)
Dept: INTERNAL MEDICINE | Facility: CLINIC | Age: 84
End: 2024-05-09
Payer: MEDICARE

## 2024-05-09 VITALS
OXYGEN SATURATION: 96 % | WEIGHT: 139 LBS | BODY MASS INDEX: 23.73 KG/M2 | SYSTOLIC BLOOD PRESSURE: 99 MMHG | HEART RATE: 63 BPM | HEIGHT: 64 IN | DIASTOLIC BLOOD PRESSURE: 61 MMHG | TEMPERATURE: 98.2 F

## 2024-05-09 DIAGNOSIS — H26.9 UNSPECIFIED CATARACT: ICD-10-CM

## 2024-05-09 DIAGNOSIS — N40.0 BENIGN PROSTATIC HYPERPLASIA WITHOUT LOWER URINARY TRACT SYMPMS: ICD-10-CM

## 2024-05-09 DIAGNOSIS — G30.8 OTHER ALZHEIMER'S DISEASE: ICD-10-CM

## 2024-05-09 DIAGNOSIS — Z96.641 PRESENCE OF RIGHT ARTIFICIAL HIP JOINT: ICD-10-CM

## 2024-05-09 DIAGNOSIS — F32.A DEPRESSION, UNSPECIFIED: ICD-10-CM

## 2024-05-09 DIAGNOSIS — F02.80 OTHER ALZHEIMER'S DISEASE: ICD-10-CM

## 2024-05-09 PROCEDURE — 99203 OFFICE O/P NEW LOW 30 MIN: CPT | Mod: 25,GC

## 2024-05-09 NOTE — END OF VISIT
[] : Resident [FreeTextEntry3] :  84 year old M presenting for referrals. Accompanied by his wife. Splits his time between [place] and [place], has psychiatrist there. Plans to establish care with geriatrician at [facility] in the next few months, but requests PT referral for R hip pain (s/p R hip arthroplasty fall 2023) and opthalmology (bilateral cataracts) in the meantime. Well appearing on exam. Referrals provided. Pt will establish with new PCP in the coming months, but will f/u as needed with us before then.

## 2024-05-09 NOTE — ASSESSMENT
[FreeTextEntry1] : 84M Refractory depression x35yrs (on Phenelzine 15mg x2 in AM x4 in PM, s/p psych adm w/ ECT in 2023, sees Psychiatrist Dr. Stephen Kulkarni in [facility] has appt on 5/12), BPH w/ likely overflow incontinence (on Dutasteride 0.5mg Qd, sees Urologist Dr. Saeed Garcia in [place]), Orthostatic hypotension (supposed to take Fludrocortisone 0.1mg Qd but refrains as almost passed out once), Vit.D deficiency (on 4000U Qd), Alzheimer's dementia (on Memantine 10mg BID & Rivastigmine 1.5mch BID), s/p Hernia reduction 2000s, s/p fall R hip arthroplasty 2017, s/p R hip fixation 2/2 fracture 2023, Cataracts (need referral for procedure). Here for PT and cataract surgery referral. Plans to continue care at [facility].  #Refractory depression x35yrs. Takes Phenelzine 15mg x2 in AM x4 in PM (which may be causing his orthosatic hypotension). Hx Psych admission w/ ECT in 2023 w.o improvment. Sees Psychiatrist Dr. Stephen Kulkarni in [facility]. - PHQ9 -7pts today - Has appointment w/ Dr. Stephen Kulkarni on 5/12. Given complexity, would defer management to his specialist. - Advised patient that his Phenelzine medication may be causing his orthostasis, and given that his mood is not well controlled, to discuss alternative options with his psychiatrist.  #RLE pain Hx femur fracture 2/2 fall at [facility]. s/p R hip arthroplasty 2017 & R hip fixation 2023. - Referred to PT  #Cataracts - Referred to ophthalmologist  #BPH  w/ likely overflow incontinence "wears a diaper, unable to completely empty bladder". C/o dysuria for a few months that was worked up w/o findings by his urologist (Dr. Saeed Garcia in [place]). - Defer care to his urologist  #HCM Already had bloodwork done with their [place] PCP last week. Would prefer to establish care here with provider at [facility]. Do not intend to follow up at our clinic. - Referred to Geriatrics at [facility]

## 2024-05-09 NOTE — HISTORY OF PRESENT ILLNESS
[FreeTextEntry8] : 84M Refractory depression x35yrs (on Phenelzine 15mg x2 in AM x4 in PM, s/p psych adm w/ ECT in , sees Psychiatrist Dr. Stephen Kulkarni in [facility] has appt on ), BPH w/ likely overflow incontinence (on Dutasteride 0.5mg Qd, sees Urologist Dr. Saeed Garcia in [place]), Orthostatic hypotension (supposed to take Fludrocortisone 0.1mg Qd but refrains as almost passed out once), Vit.D deficiency (on 4000U Qd), Alzheimer's dementia (on Memantine 10mg BID & Rivastigmine 1.5mch BID), s/p Hernia reduction , s/p fall R hip arthroplasty , s/p R hip fixation  fracture , Cataracts (need referral for procedure. Lives between [place] (PCP: Dr. Mayuri Byrnes phone #[phone], Fax: [phone]) and [facility], looking for a primary care geriatrician in [facility]. They wanted to see a provider form [facility] as their medical records are there. However, appointments were 2 months out. Therefore, they came here to get a PT & ophthalmology referral in the interim. Currently he feels "lousy" because he can't be active iso R leg pain from past fracture. ROS: dysuria for a few months that was worked up by his urologist without result.  FHx: father & brother had "heart condition"  at 71yo & 79yo. mother had liver cancer unknown etiology  at 79yo. SHx: Was an . Retired now. Balanced diet. Used to be active, played tennis x3/wk until 2023 when he has a fall causing a femur fracture, Never smoker. Occasional glass of wine. Denies recreational drugs. NKA. They think they are UTD on vaccines except the Shingles vaccine, they had a colonoscopy a few years ago that was normal.

## 2024-05-09 NOTE — HEALTH RISK ASSESSMENT
[0] : 1) Little interest or pleasure doing things: Not at all (0) [1] : 2) Feeling down, depressed, or hopeless for several days (1) [[id number]] : 1

## 2024-05-24 ENCOUNTER — NON-APPOINTMENT (OUTPATIENT)
Age: 84
End: 2024-05-24

## 2024-05-29 ENCOUNTER — APPOINTMENT (OUTPATIENT)
Dept: OPHTHALMOLOGY | Facility: CLINIC | Age: 84
End: 2024-05-29
Payer: MEDICARE

## 2024-05-29 ENCOUNTER — NON-APPOINTMENT (OUTPATIENT)
Age: 84
End: 2024-05-29

## 2024-05-29 PROCEDURE — 92136 OPHTHALMIC BIOMETRY: CPT

## 2024-05-29 PROCEDURE — 92025 CPTRIZED CORNEAL TOPOGRAPHY: CPT

## 2024-05-29 PROCEDURE — 92250 FUNDUS PHOTOGRAPHY W/I&R: CPT

## 2024-05-29 PROCEDURE — 92004 COMPRE OPH EXAM NEW PT 1/>: CPT

## 2024-06-13 NOTE — PATIENT PROFILE ADULT. - AS SC BRADEN SENSORY
[employer] message sent on 6/13/24.    Dylan Devi ATC     Attempted to call patient with update on what to expect for surgery given by provider. No answer, no consent to communicate. Left voicemail to call back, scheduling number given.    Dylan Devi ATC     Chun called and is wondering what the recovery time would be from his surgery and what his mobility time frame will be, (skate board, run).     Also would like to know if he needs another surgery to remove the plate?    Chun is aware that Dr. Crowe is out of the clinic and I advised would have another colleague see if they could answer any questions.       Other: Patient returning call. Please call patient. Patient ok with [employer] message as well.      Could we send this information to you in [employer] or would you prefer to receive a phone call?:   Patient would prefer a phone call   Okay to leave a detailed message?: Yes at Cell number on file:    Telephone Information:   Mobile [phone]   Mobile [phone]         Patient was seen by Dr. Crowe 6/12/24 and case request was placed for ORIF right great toe fracture. Surgery has not yet been scheduled.     Please see message below and advise on response regarding recovery timeline and post-operative restrictions.     Maribel Garcia MSA, ATC  Certified Athletic Trainer    no known precautions/limitations (4) no impairment

## 2024-10-14 NOTE — ASU PATIENT PROFILE, ADULT - NS PREOP UNDERSTANDS INFO
Spoke to patient's wife  Hue , have your   to be  NPO/NO solid foods after 12 MN,  allow to drink water or apple juice till   4 am, dress him comfortable,  no lotions, no jewelry, Bring ID photo and insurance cards,  escort arranged with wife,   address and  telephone given/yes

## 2024-10-14 NOTE — ASU PATIENT PROFILE, ADULT - FALL HARM RISK - HARM RISK INTERVENTIONS
Communicate Risk of Fall with Harm to all staff/Reinforce activity limits and safety measures with patient and family/Tailored Fall Risk Interventions/Visual Cue: Yellow wristband and red socks/Bed in lowest position, wheels locked, appropriate side rails in place/Call bell, personal items and telephone in reach/Instruct patient to call for assistance before getting out of bed or chair/Non-slip footwear when patient is out of bed/[place] to call system/Physically safe environment - no spills, clutter or unnecessary equipment/Purposeful Proactive Rounding/Room/bathroom lighting operational, light cord in reach Assistance with ambulation/Assistance OOB with selected safe patient handling equipment/Communicate Risk of Fall with Harm to all staff/Discuss with provider need for PT consult/Monitor gait and stability/Provide patient with walking aids - walker, cane, crutches/Reinforce activity limits and safety measures with patient and family/Tailored Fall Risk Interventions/Visual Cue: Yellow wristband and red socks/Bed in lowest position, wheels locked, appropriate side rails in place/Call bell, personal items and telephone in reach/Instruct patient to call for assistance before getting out of bed or chair/Non-slip footwear when patient is out of bed/[place] to call system/Physically safe environment - no spills, clutter or unnecessary equipment/Purposeful Proactive Rounding/Room/bathroom lighting operational, light cord in reach

## 2024-10-14 NOTE — ASU PATIENT PROFILE, ADULT - NSICDXPASTSURGICALHX_GEN_ALL_CORE_FT
PAST SURGICAL HISTORY:  H/O eye surgery drooping upper eyelids 2013 BILAT    H/O hernia repair RIGHT    History of hip replacement RIGHT    S/P tonsillectomy as a child

## 2024-10-14 NOTE — ASU PATIENT PROFILE, ADULT - NSICDXPASTMEDICALHX_GEN_ALL_CORE_FT
PAST MEDICAL HISTORY:  Anxiety and depression     History of osteoarthritis     Hypercholesteremia     Hypertension     Inguinal hernia left     PAST MEDICAL HISTORY:  Anxiety and depression     History of osteoarthritis     Hypercholesteremia     Hypertension     Inguinal hernia left    Retention of urine, unspecified     Urinary tract infection

## 2024-10-14 NOTE — ASU PATIENT PROFILE, ADULT - MEDICATIONS TO TAKE
denies taking blood pressure medicine, ,  asked to bring all bottle of medicine denies taking blood pressure medicine, ,  instead he takes  Fludrocortisone  to increase  Systolic Blood pressure , will take  Cefuroxime    for Urinary infection ,  Dutasteride  for urinary retention    as  per MD ,

## 2024-10-15 ENCOUNTER — OUTPATIENT (OUTPATIENT)
Dept: OUTPATIENT SERVICES | Facility: HOSPITAL | Age: 84
LOS: 1 days | Discharge: HOME | End: 2024-10-15
Payer: MEDICARE

## 2024-10-15 ENCOUNTER — APPOINTMENT (OUTPATIENT)
Dept: OPHTHALMOLOGY | Facility: AMBULATORY SURGERY CENTER | Age: 84
End: 2024-10-15

## 2024-10-15 VITALS
HEART RATE: 59 BPM | OXYGEN SATURATION: 95 % | SYSTOLIC BLOOD PRESSURE: 140 MMHG | RESPIRATION RATE: 15 BRPM | DIASTOLIC BLOOD PRESSURE: 71 MMHG | TEMPERATURE: 97 F

## 2024-10-15 VITALS
TEMPERATURE: 97 F | HEART RATE: 59 BPM | OXYGEN SATURATION: 96 % | RESPIRATION RATE: 16 BRPM | SYSTOLIC BLOOD PRESSURE: 145 MMHG | DIASTOLIC BLOOD PRESSURE: 76 MMHG

## 2024-10-15 DIAGNOSIS — Z90.89 ACQUIRED ABSENCE OF OTHER ORGANS: Chronic | ICD-10-CM

## 2024-10-15 DIAGNOSIS — Z96.649 PRESENCE OF UNSPECIFIED ARTIFICIAL HIP JOINT: Chronic | ICD-10-CM

## 2024-10-15 DIAGNOSIS — Z98.890 OTHER SPECIFIED POSTPROCEDURAL STATES: Chronic | ICD-10-CM

## 2024-10-15 PROCEDURE — 66984 XCAPSL CTRC RMVL W/O ECP: CPT | Mod: RT

## 2024-10-15 DEVICE — LENS IOL CLAREON CCA0T0 23.5D
Type: IMPLANTABLE DEVICE | Status: NON-FUNCTIONAL
Removed: 2024-10-15

## 2024-10-15 RX ORDER — TROPICAMIDE 1 %
1 DROPS OPHTHALMIC (EYE)
Refills: 0 | Status: COMPLETED | OUTPATIENT
Start: 2024-10-15 | End: 2024-10-15

## 2024-10-15 RX ORDER — PHENYLEPHRINE HYDROCHLORIDE 100 MG/ML
1 SOLUTION/ DROPS OPHTHALMIC
Refills: 0 | Status: COMPLETED | OUTPATIENT
Start: 2024-10-15 | End: 2024-10-15

## 2024-10-15 RX ORDER — TETRACAINE HCL 0.5 %
1 DROPS OPHTHALMIC (EYE) ONCE
Refills: 0 | Status: COMPLETED | OUTPATIENT
Start: 2024-10-15 | End: 2024-10-15

## 2024-10-15 RX ORDER — OFLOXACIN 3 MG/ML
1 SOLUTION/ DROPS OPHTHALMIC
Refills: 0 | Status: COMPLETED | OUTPATIENT
Start: 2024-10-15 | End: 2024-10-15

## 2024-10-15 RX ORDER — SODIUM CHLORIDE IRRIG SOLUTION 0.9 %
500 SOLUTION, IRRIGATION IRRIGATION
Refills: 0 | Status: DISCONTINUED | OUTPATIENT
Start: 2024-10-15 | End: 2024-10-15

## 2024-10-15 RX ORDER — ONDANSETRON HCL/PF 4 MG/2 ML
4 VIAL (ML) INJECTION ONCE
Refills: 0 | Status: DISCONTINUED | OUTPATIENT
Start: 2024-10-15 | End: 2024-10-15

## 2024-10-15 RX ADMIN — Medication 1 DROP(S): at 12:25

## 2024-10-15 RX ADMIN — OFLOXACIN 1 DROP(S): 3 SOLUTION/ DROPS OPHTHALMIC at 12:15

## 2024-10-15 RX ADMIN — Medication 1 DROP(S): at 12:15

## 2024-10-15 RX ADMIN — OFLOXACIN 1 DROP(S): 3 SOLUTION/ DROPS OPHTHALMIC at 12:20

## 2024-10-15 RX ADMIN — PHENYLEPHRINE HYDROCHLORIDE 1 DROP(S): 100 SOLUTION/ DROPS OPHTHALMIC at 12:15

## 2024-10-15 RX ADMIN — Medication 1 DROP(S): at 12:20

## 2024-10-15 RX ADMIN — PHENYLEPHRINE HYDROCHLORIDE 1 DROP(S): 100 SOLUTION/ DROPS OPHTHALMIC at 12:20

## 2024-10-15 RX ADMIN — OFLOXACIN 1 DROP(S): 3 SOLUTION/ DROPS OPHTHALMIC at 12:25

## 2024-10-15 RX ADMIN — PHENYLEPHRINE HYDROCHLORIDE 1 DROP(S): 100 SOLUTION/ DROPS OPHTHALMIC at 12:25

## 2024-10-15 NOTE — OPERATIVE REPORT - OPERATIVE RPOSRT DETAILS
ASSISTANT SURGEON:  Irish Chambers MD    DATE OF SURGERY:  10-15-24    ANESTHESIA:  MAC/TOPICAL	    ESTIMATED BLOOD LOSS: < 1mL	    COMPLICATIONS:  none		    PREOPERATIVE DIAGNOSIS: Nuclear Sclerotic  Cataract, Right eye    POSTOPERATIVE DIAGNOSIS: Nuclear Sclerotic  Cataract, Right eye    OPERATIVE PROCEDURE:  Phacoemulsification with insertion of posterior chamber intraocular lens Right eye    LENS IMPLANT CCAOTO +23.5    PROCEDURE:	  The patient was brought into the operating room and placed supine on the operating room table. After uneventful induction of neuroleptic anesthesia, tetracaine was instilled into the operative eye achieving sufficient anesthesia.  The patient was then prepped and draped in the usual sterile fashion.  A wire lid speculum was then inserted into the operative eye giving a wide palpebral fissure.      A noe knife was used to perform paracenteses through clear cornea at the 12 and 6 o’clock limbal positions.  The anterior chamber was irrigated with lidocaine 1% nonpreserved.  Viscoelastic was then used to maintain the anterior chamber.  A keratome was used to create a clear corneal incision just inside the temporal limbus.  A bent 25 gauge needle was then used to initiate an anterior capsulorhexis, which was completed with the use of capsulorhexis forceps.  Balanced salt solution was used to hydrodissect the nucleus.  The [employer] phacoemulsification unit was then used to completely phacoemulsify the nucleus, following which an aspirating handpiece was used to aspirate all cortical remnants from the capsular bag.  Viscoelastic was again used to reform the anterior chamber and capsular bag.      A new foldable posterior chamber intraocular lens was brought into the surgical field.  It was folded and directly injected into the capsular bag following which it was centered and secure.  All viscoelastic was then aspirated from the anterior segment using an aspirating handpiece.  All wounds were checked for leaks and there were none.  Tobramycin 0.3%/dexamethasone 0.1% solution was used to irrigate the surface of the eye. The lid speculum was removed from the eye and a shield placed over the eye.      The patient tolerated the procedure well and went to the recovery area in good condition.        Jayy SCHAFFER MD was present for all aspects of the case.						       ASSISTANT SURGEON:  Irish Chambers MD    DATE OF SURGERY:  10-15-24    ANESTHESIA:  MAC/TOPICAL	    ESTIMATED BLOOD LOSS: < 1mL	    COMPLICATIONS:  none		    PREOPERATIVE DIAGNOSIS: Nuclear Sclerotic  Cataract, Right eye    POSTOPERATIVE DIAGNOSIS: Nuclear Sclerotic  Cataract, Right eye    OPERATIVE PROCEDURE:  Phacoemulsification with insertion of posterior chamber intraocular lens Right eye.    LENS IMPLANT CCAOTO +23.5    PROCEDURE:	  The patient was brought into the operating room and placed supine on the operating room table. After uneventful induction of neuroleptic anesthesia, tetracaine was instilled into the operative eye achieving sufficient anesthesia.  The patient was then prepped and draped in the usual sterile fashion.  A wire lid speculum was then inserted into the operative eye giving a wide palpebral fissure.      A noe knife was used to perform paracenteses through clear cornea at the 12 and 6 o’clock limbal positions.  The anterior chamber was irrigated with lidocaine 1% nonpreserved.  Viscoelastic was then used to maintain the anterior chamber.  A keratome was used to create a clear corneal incision just inside the temporal limbus.  A bent 25 gauge needle was then used to initiate an anterior capsulorhexis, which was completed with the use of capsulorhexis forceps.  Balanced salt solution was used to hydrodissect the nucleus.  [facility] ph[facility]ification [facility] was then used to completely phacoemulsify the nucleus, following which an aspirating handpiece was used to aspirate all cortical remnants from the capsular bag.  Viscoelastic was again used to reform the anterior chamber and capsular bag.      A new foldable posterior chamber intraocular lens was brought into the surgical field.  It was folded and directly injected into the capsular bag following which it was centered and secure.  All viscoelastic was then aspirated from the anterior segment using an aspirating handpiece.  All wounds were checked for leaks and there were none.  Tobramycin 0.3%/dexamethasone 0.1% solution was used to irrigate the surface of the eye. The lid speculum was removed from the eye and a shield placed over the eye.      The patient tolerated the procedure well and went to the recovery area in good condition.        Jayy SCHAFFER MD was present for all aspects of the case.

## 2024-10-16 ENCOUNTER — APPOINTMENT (OUTPATIENT)
Dept: OPHTHALMOLOGY | Facility: CLINIC | Age: 84
End: 2024-10-16
Payer: MEDICARE

## 2024-10-16 ENCOUNTER — NON-APPOINTMENT (OUTPATIENT)
Age: 84
End: 2024-10-16

## 2024-10-16 PROCEDURE — 99024 POSTOP FOLLOW-UP VISIT: CPT

## 2024-10-21 NOTE — ASU PATIENT PROFILE, ADULT - ABILITY TO HEAR (WITH HEARING AID OR HEARING APPLIANCE IF NORMALLY USED):
Adequate: hears normal conversation without difficulty has hearing aid but doesn't use them/Mildly to Moderately Impaired: difficulty hearing in some environments or speaker may need to increase volume or speak distinctly

## 2024-10-21 NOTE — ASU PATIENT PROFILE, ADULT - NSICDXPASTSURGICALHX_GEN_ALL_CORE_FT
PAST SURGICAL HISTORY:  H/O eye surgery drooping upper eyelids 2013 BILAT    H/O fracture of femur     H/O hernia repair RIGHT    History of hip replacement RIGHT    S/P IVC filter     S/P tonsillectomy as a child

## 2024-10-21 NOTE — ASU PATIENT PROFILE, ADULT - NS PREOP UNDERSTANDS INFO
Informed pt about surgery time, and when to arrive by. Last meal @0000, no solid foods including dairy products/substitutes, chewing gum or hard candy. Can drink clear liquids up to 3 hours before surgery. Bring photo ID, insurance card and form of payment to the first floor. Must have an escort that is 18+ and they must have a photo ID with them to enter the building. Remove all jewelry, piercings, and contacts before coming to the hospital./yes

## 2024-10-21 NOTE — ASU PATIENT PROFILE, ADULT - NSICDXPASTMEDICALHX_GEN_ALL_CORE_FT
PAST MEDICAL HISTORY:  Anxiety and depression     History of osteoarthritis     Hypercholesteremia     Hypertension     Inguinal hernia left    Retention of urine, unspecified     Urinary tract infection      PAST MEDICAL HISTORY:  Anxiety and depression     History of osteoarthritis     Hypercholesteremia     Hypertension     Inguinal hernia left    Memory loss     Pulmonary embolism     Retention of urine, unspecified     Urinary tract infection

## 2024-10-21 NOTE — ASU PATIENT PROFILE, ADULT - FALL HARM RISK - HARM RISK INTERVENTIONS
Assistance with ambulation/Communicate Risk of Fall with Harm to all staff/Monitor for mental status changes/Monitor gait and stability/Reinforce activity limits and safety measures with patient and family/Tailored Fall Risk Interventions/Visual Cue: Yellow wristband and red socks/Bed in lowest position, wheels locked, appropriate side rails in place/Call bell, personal items and telephone in reach/Instruct patient to call for assistance before getting out of bed or chair/Non-slip footwear when patient is out of bed/[place] to call system/Physically safe environment - no spills, clutter or unnecessary equipment/Purposeful Proactive Rounding/Room/bathroom lighting operational, light cord in reach

## 2024-10-22 ENCOUNTER — APPOINTMENT (OUTPATIENT)
Dept: OPHTHALMOLOGY | Facility: AMBULATORY SURGERY CENTER | Age: 84
End: 2024-10-22

## 2024-10-22 ENCOUNTER — OUTPATIENT (OUTPATIENT)
Dept: OUTPATIENT SERVICES | Facility: HOSPITAL | Age: 84
LOS: 1 days | Discharge: HOME | End: 2024-10-22
Payer: MEDICARE

## 2024-10-22 VITALS
HEART RATE: 62 BPM | OXYGEN SATURATION: 96 % | DIASTOLIC BLOOD PRESSURE: 56 MMHG | SYSTOLIC BLOOD PRESSURE: 96 MMHG | WEIGHT: 136.47 LBS | RESPIRATION RATE: 16 BRPM | HEIGHT: 64 IN | TEMPERATURE: 98 F

## 2024-10-22 VITALS
OXYGEN SATURATION: 95 % | TEMPERATURE: 98 F | HEART RATE: 60 BPM | DIASTOLIC BLOOD PRESSURE: 60 MMHG | RESPIRATION RATE: 16 BRPM | SYSTOLIC BLOOD PRESSURE: 116 MMHG

## 2024-10-22 DIAGNOSIS — Z98.890 OTHER SPECIFIED POSTPROCEDURAL STATES: Chronic | ICD-10-CM

## 2024-10-22 DIAGNOSIS — Z87.81 PERSONAL HISTORY OF (HEALED) TRAUMATIC FRACTURE: Chronic | ICD-10-CM

## 2024-10-22 DIAGNOSIS — Z90.89 ACQUIRED ABSENCE OF OTHER ORGANS: Chronic | ICD-10-CM

## 2024-10-22 DIAGNOSIS — Z96.649 PRESENCE OF UNSPECIFIED ARTIFICIAL HIP JOINT: Chronic | ICD-10-CM

## 2024-10-22 DIAGNOSIS — Z95.828 PRESENCE OF OTHER VASCULAR IMPLANTS AND GRAFTS: Chronic | ICD-10-CM

## 2024-10-22 PROCEDURE — 66984 XCAPSL CTRC RMVL W/O ECP: CPT | Mod: LT,79

## 2024-10-22 DEVICE — LENS IOL CLAREON CCA0T0 24.5D
Type: IMPLANTABLE DEVICE | Site: LEFT | Status: NON-FUNCTIONAL
Removed: 2024-10-22

## 2024-10-22 RX ORDER — FLUDROCORTISONE ACETATE 0.1 MG/1
1 TABLET ORAL
Refills: 0 | DISCHARGE

## 2024-10-22 RX ORDER — TETRACAINE HCL 0.5 %
1 DROPS OPHTHALMIC (EYE) ONCE
Refills: 0 | Status: COMPLETED | OUTPATIENT
Start: 2024-10-22 | End: 2024-10-22

## 2024-10-22 RX ORDER — OFLOXACIN 3 MG/ML
1 SOLUTION/ DROPS OPHTHALMIC
Refills: 0 | Status: COMPLETED | OUTPATIENT
Start: 2024-10-22 | End: 2024-10-22

## 2024-10-22 RX ORDER — TROPICAMIDE 1 %
1 DROPS OPHTHALMIC (EYE)
Refills: 0 | Status: COMPLETED | OUTPATIENT
Start: 2024-10-22 | End: 2024-10-22

## 2024-10-22 RX ORDER — DUTASTERIDE 0.5 MG/1
2 CAPSULE, LIQUID FILLED ORAL
Refills: 0 | DISCHARGE

## 2024-10-22 RX ORDER — PHENYLEPHRINE HYDROCHLORIDE 100 MG/ML
1 SOLUTION/ DROPS OPHTHALMIC
Refills: 0 | Status: COMPLETED | OUTPATIENT
Start: 2024-10-22 | End: 2024-10-22

## 2024-10-22 RX ADMIN — OFLOXACIN 1 DROP(S): 3 SOLUTION/ DROPS OPHTHALMIC at 12:13

## 2024-10-22 RX ADMIN — PHENYLEPHRINE HYDROCHLORIDE 1 DROP(S): 100 SOLUTION/ DROPS OPHTHALMIC at 12:14

## 2024-10-22 RX ADMIN — PHENYLEPHRINE HYDROCHLORIDE 1 DROP(S): 100 SOLUTION/ DROPS OPHTHALMIC at 12:19

## 2024-10-22 RX ADMIN — OFLOXACIN 1 DROP(S): 3 SOLUTION/ DROPS OPHTHALMIC at 12:22

## 2024-10-22 RX ADMIN — OFLOXACIN 1 DROP(S): 3 SOLUTION/ DROPS OPHTHALMIC at 12:19

## 2024-10-22 RX ADMIN — Medication 1 DROP(S): at 12:18

## 2024-10-22 RX ADMIN — Medication 1 DROP(S): at 12:13

## 2024-10-22 RX ADMIN — Medication 1 DROP(S): at 12:22

## 2024-10-22 RX ADMIN — PHENYLEPHRINE HYDROCHLORIDE 1 DROP(S): 100 SOLUTION/ DROPS OPHTHALMIC at 12:22

## 2024-10-22 RX ADMIN — Medication 1 DROP(S): at 12:14

## 2024-10-22 NOTE — OPERATIVE REPORT - OPERATIVE RPOSRT DETAILS
ASSISTANT SURGEON:  Irish Chambers MD    DATE OF SURGERY:  10-22-24    ANESTHESIA:  MAC/TOPICAL	    ESTIMATED BLOOD LOSS: < 1mL	    COMPLICATIONS:  none		    PREOPERATIVE DIAGNOSIS:  Nuclear Sclerotic Cataract, Left eye    POSTOPERATIVE DIAGNOSIS:  Nuclear Sclerotic Cataract, Left eye    OPERATIVE PROCEDURE:  Phacoemulsification with insertion of posterior chamber intraocular lens Left eye    LENS IMPLANT CCAOTO +24.5    PROCEDURE:	  The patient was brought into the operating room and placed supine on the operating room table. After uneventful induction of neuroleptic anesthesia, tetracaine  was instilled into the operative eye achieving sufficient anesthesia.  The patient was then prepped and draped in the usual sterile fashion.  A wire lid speculum was then inserted into the operative eye giving a wide palpebral fissure.      A noe knife was used to perform paracenteses through clear cornea at the 12 and 6 o’clock limbal positions.  The anterior chamber was irrigated with lidocaine 1% nonpreserved.  Viscoelastic was then used to maintain the anterior chamber.  A keratome was used to create a clear corneal incision just inside the temporal limbus.  A bent 25 gauge needle was then used to initiate an anterior capsulorhexis, which was completed with the use of capsulorhexis forceps.  Balanced salt solution was used to hydrodissect the nucleus.  The [employer] phacoemulsification unit was then used to completely phacoemulsify the nucleus, following which an aspirating handpiece was used to aspirate all cortical remnants from the capsular bag.  Viscoelastic was again used to reform the anterior chamber and capsular bag.      A new foldable posterior chamber intraocular lens was brought into the surgical field.  It was folded and directly injected into the capsular bag following which it was centered and secure.  All viscoelastic was then aspirated from the anterior segment using an aspirating handpiece.  All wounds were checked for leaks and there were none.   Tobramycin 0.3%/dexamethasone 0.1% solution was used to irrigate the surface of the eye.  The lid speculum was removed from the eye and a shield placed over the eye.      The patient tolerated the procedure well and went to the recovery area in good condition.        Jayy SCHAFFER MD was present for all aspects of the case.						       ASSISTANT SURGEON:  Irish Chambers MD    DATE OF SURGERY:  10-22-24    ANESTHESIA:  MAC/TOPICAL	    ESTIMATED BLOOD LOSS: < 1mL	    COMPLICATIONS:  none		    PREOPERATIVE DIAGNOSIS:  Nuclear Sclerotic Cataract, Left eye    POSTOPERATIVE DIAGNOSIS:  Nuclear Sclerotic Cataract, Left eye    OPERATIVE PROCEDURE:  Phacoemulsification with insertion of posterior chamber intraocular lens Left eye.    LENS IMPLANT CCAOTO +24.5    PROCEDURE:	  The patient was brought into the operating room and placed supine on the operating room table. After uneventful induction of neuroleptic anesthesia, tetracaine  was instilled into the operative eye achieving sufficient anesthesia.  The patient was then prepped and draped in the usual sterile fashion.  A wire lid speculum was then inserted into the operative eye giving a wide palpebral fissure.      A noe knife was used to perform paracenteses through clear cornea at the 12 and 6 o’clock limbal positions.  The anterior chamber was irrigated with lidocaine 1% nonpreserved.  Viscoelastic was then used to maintain the anterior chamber.  A keratome was used to create a clear corneal incision just inside the temporal limbus.  A bent 25 gauge needle was then used to initiate an anterior capsulorhexis, which was completed with the use of capsulorhexis forceps.  Balanced salt solution was used to hydrodissect the nucleus.  The [employer] phacoemulsification unit was then used to completely phacoemulsify the nucleus, following which an aspirating handpiece was used to aspirate all cortical remnants from the capsular bag.  Viscoelastic was again used to reform the anterior chamber and capsular bag.      A new foldable posterior chamber intraocular lens was brought into the surgical field.  It was folded and directly injected into the capsular bag following which it was centered and secure.  All viscoelastic was then aspirated from the anterior segment using an aspirating handpiece.  All wounds were checked for leaks and there were none.   Tobramycin 0.3%/dexamethasone 0.1% solution was used to irrigate the surface of the eye.  The lid speculum was removed from the eye and a shield placed over the eye.      The patient tolerated the procedure well and went to the recovery area in good condition.        Jayy SCHAFFER MD was present for all aspects of the case.

## 2024-10-23 ENCOUNTER — APPOINTMENT (OUTPATIENT)
Dept: OPHTHALMOLOGY | Facility: CLINIC | Age: 84
End: 2024-10-23
Payer: MEDICARE

## 2024-10-23 ENCOUNTER — NON-APPOINTMENT (OUTPATIENT)
Age: 84
End: 2024-10-23

## 2024-10-23 PROCEDURE — 99024 POSTOP FOLLOW-UP VISIT: CPT

## 2024-10-23 NOTE — ASU PREOP CHECKLIST - INTERNAL PROSTHESES
Quality 47: Advance Care Plan: Advance Care Planning discussed and documented; advance care plan or surrogate decision maker documented in the medical record. Quality 226: Preventive Care And Screening: Tobacco Use: Screening And Cessation Intervention: Patient screened for tobacco use and is an ex/non-smoker Detail Level: Detailed no

## 2024-10-28 ENCOUNTER — NON-APPOINTMENT (OUTPATIENT)
Age: 84
End: 2024-10-28

## 2024-10-28 ENCOUNTER — APPOINTMENT (OUTPATIENT)
Dept: OPHTHALMOLOGY | Facility: CLINIC | Age: 84
End: 2024-10-28
Payer: MEDICARE

## 2024-10-28 PROCEDURE — 99024 POSTOP FOLLOW-UP VISIT: CPT

## (undated) DEVICE — SPECIMEN CONTAINER 100ML

## (undated) DEVICE — ENDOGLIDE ULTRATHIN

## (undated) DEVICE — CANNULA ANT CHMBR 27GX22MM

## (undated) DEVICE — NDL RETROBULBAR VISITEC 25X1.5

## (undated) DEVICE — PACK CENTURION 2.4MM

## (undated) DEVICE — KIT CENTURION ANTERIOR

## (undated) DEVICE — PACK ANTERIOR SEGMENT

## (undated) DEVICE — VENODYNE/SCD SLEEVE CALF MEDIUM

## (undated) DEVICE — DRAPE MICROSCOPE KNOB COVER SMALL (2 PCS)

## (undated) DEVICE — SUT NYLON 10-0 12" CU-5

## (undated) DEVICE — GLV 8 PROTEXIS (WHITE)

## (undated) DEVICE — WARMING BLANKET LOWER ADULT

## (undated) DEVICE — CANNULA ALCON CONSTELLATION INFUSION 23G WITH PRIMING TRAY

## (undated) DEVICE — CANNULA IRR ANT CHAMBER 30G

## (undated) DEVICE — SOL IRR BAL SALT 500ML